# Patient Record
Sex: MALE | Race: WHITE | Employment: FULL TIME | URBAN - NONMETROPOLITAN AREA
[De-identification: names, ages, dates, MRNs, and addresses within clinical notes are randomized per-mention and may not be internally consistent; named-entity substitution may affect disease eponyms.]

---

## 2020-01-25 ENCOUNTER — HOSPITAL ENCOUNTER (EMERGENCY)
Age: 20
Discharge: HOME | End: 2020-01-25

## 2020-01-25 VITALS
OXYGEN SATURATION: 97 % | WEIGHT: 145 LBS | DIASTOLIC BLOOD PRESSURE: 87 MMHG | TEMPERATURE: 97.8 F | SYSTOLIC BLOOD PRESSURE: 142 MMHG | HEART RATE: 100 BPM | RESPIRATION RATE: 16 BRPM

## 2020-01-25 PROCEDURE — 99202 OFFICE O/P NEW SF 15 MIN: CPT

## 2020-01-25 PROCEDURE — 99214 OFFICE O/P EST MOD 30 MIN: CPT | Performed by: NURSE PRACTITIONER

## 2020-01-25 RX ORDER — PREDNISONE 20 MG/1
20 TABLET ORAL 2 TIMES DAILY
Qty: 10 TABLET | Refills: 0 | Status: SHIPPED | OUTPATIENT
Start: 2020-01-25 | End: 2020-01-30

## 2020-01-25 RX ORDER — CYCLOBENZAPRINE HCL 10 MG
10 TABLET ORAL 3 TIMES DAILY PRN
Qty: 15 TABLET | Refills: 0 | Status: SHIPPED | OUTPATIENT
Start: 2020-01-25 | End: 2020-01-30

## 2020-01-25 ASSESSMENT — ENCOUNTER SYMPTOMS
DIARRHEA: 0
EYE REDNESS: 0
EYE PAIN: 0
SORE THROAT: 0
BACK PAIN: 1
WHEEZING: 0
CONSTIPATION: 0
COUGH: 0
NAUSEA: 0
VOMITING: 0
EYE DISCHARGE: 0
RHINORRHEA: 0
ALLERGIC/IMMUNOLOGIC NEGATIVE: 1
SHORTNESS OF BREATH: 0
ABDOMINAL PAIN: 0
TROUBLE SWALLOWING: 0

## 2020-01-25 ASSESSMENT — PAIN SCALES - GENERAL: PAINLEVEL_OUTOF10: 7

## 2020-01-25 ASSESSMENT — PAIN DESCRIPTION - PAIN TYPE: TYPE: ACUTE PAIN

## 2020-01-25 ASSESSMENT — PAIN DESCRIPTION - DESCRIPTORS: DESCRIPTORS: ACHING

## 2020-01-25 ASSESSMENT — PAIN DESCRIPTION - LOCATION: LOCATION: BACK

## 2020-01-25 ASSESSMENT — PAIN DESCRIPTION - FREQUENCY: FREQUENCY: CONTINUOUS

## 2020-01-25 ASSESSMENT — PAIN DESCRIPTION - ORIENTATION: ORIENTATION: LOWER;LEFT

## 2020-01-25 NOTE — LETTER
[facility] [facility]  [address] [place] [id number]  Phone: [phone]               January 25, 2020    Patient: Saad Bonds   YOB: 2000   Date of Visit: 1/25/2020       To Whom It May Concern:    Shanda Ibanez was seen and treated in our emergency department on 1/25/2020. He may return to work on 1/27/2020.       Sincerely,       [facility], LPN         Signature:__________________________________
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
## 2020-07-10 ENCOUNTER — HOSPITAL ENCOUNTER (INPATIENT)
Age: 20
LOS: 4 days | Discharge: HOME | DRG: 885 | End: 2020-07-14
Attending: PSYCHIATRY & NEUROLOGY | Admitting: PSYCHIATRY & NEUROLOGY
Payer: COMMERCIAL

## 2020-07-10 PROBLEM — F32.9 MAJOR DEPRESSIVE EPISODE: Status: ACTIVE | Noted: 2020-07-10

## 2020-07-10 LAB
ACETAMINOPHEN LEVEL: < 5 UG/ML (ref 0–20)
ALBUMIN SERPL-MCNC: 5.1 G/DL (ref 3.5–5.1)
ALP BLD-CCNC: 90 U/L (ref 38–126)
ALT SERPL-CCNC: 20 U/L (ref 11–66)
AMPHETAMINE+METHAMPHETAMINE URINE SCREEN: NEGATIVE
ANION GAP SERPL CALCULATED.3IONS-SCNC: 11 MEQ/L (ref 8–16)
AST SERPL-CCNC: 17 U/L (ref 5–40)
BACTERIA: ABNORMAL /HPF
BARBITURATE QUANTITATIVE URINE: NEGATIVE
BASOPHILS # BLD: 0.8 %
BASOPHILS ABSOLUTE: 0.1 THOU/MM3 (ref 0–0.1)
BENZODIAZEPINE QUANTITATIVE URINE: NEGATIVE
BILIRUB SERPL-MCNC: 0.8 MG/DL (ref 0.3–1.2)
BILIRUBIN DIRECT: < 0.2 MG/DL (ref 0–0.3)
BILIRUBIN URINE: NEGATIVE
BLOOD, URINE: NEGATIVE
BUN BLDV-MCNC: 14 MG/DL (ref 7–22)
CALCIUM SERPL-MCNC: 9.9 MG/DL (ref 8.5–10.5)
CANNABINOID QUANTITATIVE URINE: POSITIVE
CASTS 2: ABNORMAL /LPF
CASTS UA: ABNORMAL /LPF
CHARACTER, URINE: CLEAR
CHLORIDE BLD-SCNC: 101 MEQ/L (ref 98–111)
CO2: 28 MEQ/L (ref 23–33)
COCAINE METABOLITE QUANTITATIVE URINE: NEGATIVE
COLOR: ABNORMAL
CREAT SERPL-MCNC: 0.8 MG/DL (ref 0.4–1.2)
CRYSTALS, UA: ABNORMAL
EOSINOPHIL # BLD: 4.1 %
EOSINOPHILS ABSOLUTE: 0.3 THOU/MM3 (ref 0–0.4)
EPITHELIAL CELLS, UA: ABNORMAL /HPF
ERYTHROCYTE [DISTWIDTH] IN BLOOD BY AUTOMATED COUNT: 12.2 % (ref 11.5–14.5)
ERYTHROCYTE [DISTWIDTH] IN BLOOD BY AUTOMATED COUNT: 40.3 FL (ref 35–45)
ETHYL ALCOHOL, SERUM: < 0.01 %
GFR SERPL CREATININE-BSD FRML MDRD: > 90 ML/MIN/1.73M2
GLUCOSE BLD-MCNC: 96 MG/DL (ref 70–108)
GLUCOSE URINE: NEGATIVE MG/DL
HCT VFR BLD CALC: 48.4 % (ref 42–52)
HEMOGLOBIN: 16.7 GM/DL (ref 14–18)
IMMATURE GRANS (ABS): 0.01 THOU/MM3 (ref 0–0.07)
IMMATURE GRANULOCYTES: 0.2 %
KETONES, URINE: ABNORMAL
LEUKOCYTE ESTERASE, URINE: ABNORMAL
LYMPHOCYTES # BLD: 28.5 %
LYMPHOCYTES ABSOLUTE: 1.8 THOU/MM3 (ref 1–4.8)
MCH RBC QN AUTO: 31.3 PG (ref 26–33)
MCHC RBC AUTO-ENTMCNC: 34.5 GM/DL (ref 32.2–35.5)
MCV RBC AUTO: 90.6 FL (ref 80–94)
MISCELLANEOUS 2: ABNORMAL
MONOCYTES # BLD: 8.6 %
MONOCYTES ABSOLUTE: 0.5 THOU/MM3 (ref 0.4–1.3)
NITRITE, URINE: NEGATIVE
NUCLEATED RED BLOOD CELLS: 0 /100 WBC
OPIATES, URINE: NEGATIVE
OSMOLALITY CALCULATION: 279.7 MOSMOL/KG (ref 275–300)
OXYCODONE: NEGATIVE
PH UA: 6 (ref 5–9)
PHENCYCLIDINE QUANTITATIVE URINE: NEGATIVE
PLATELET # BLD: 229 THOU/MM3 (ref 130–400)
PMV BLD AUTO: 9.2 FL (ref 9.4–12.4)
POTASSIUM SERPL-SCNC: 4.7 MEQ/L (ref 3.5–5.2)
PROTEIN UA: 30
RBC # BLD: 5.34 MILL/MM3 (ref 4.7–6.1)
RBC URINE: ABNORMAL /HPF
RENAL EPITHELIAL, UA: ABNORMAL
SALICYLATE, SERUM: < 0.3 MG/DL (ref 2–10)
SEG NEUTROPHILS: 57.8 %
SEGMENTED NEUTROPHILS ABSOLUTE COUNT: 3.6 THOU/MM3 (ref 1.8–7.7)
SODIUM BLD-SCNC: 140 MEQ/L (ref 135–145)
SPECIFIC GRAVITY, URINE: > 1.03 (ref 1–1.03)
T4 FREE: 1.39 NG/DL (ref 0.93–1.76)
TOTAL PROTEIN: 7.7 G/DL (ref 6.1–8)
TSH SERPL DL<=0.05 MIU/L-ACNC: 0.98 UIU/ML (ref 0.4–4.2)
UROBILINOGEN, URINE: 1 EU/DL (ref 0–1)
WBC # BLD: 6.3 THOU/MM3 (ref 4.8–10.8)
WBC UA: ABNORMAL /HPF
YEAST: ABNORMAL

## 2020-07-10 PROCEDURE — 80307 DRUG TEST PRSMV CHEM ANLYZR: CPT

## 2020-07-10 PROCEDURE — 81001 URINALYSIS AUTO W/SCOPE: CPT

## 2020-07-10 PROCEDURE — 84443 ASSAY THYROID STIM HORMONE: CPT

## 2020-07-10 PROCEDURE — 36415 COLL VENOUS BLD VENIPUNCTURE: CPT

## 2020-07-10 PROCEDURE — 84439 ASSAY OF FREE THYROXINE: CPT

## 2020-07-10 PROCEDURE — 1240000000 HC EMOTIONAL WELLNESS R&B

## 2020-07-10 PROCEDURE — G0480 DRUG TEST DEF 1-7 CLASSES: HCPCS

## 2020-07-10 PROCEDURE — 99285 EMERGENCY DEPT VISIT HI MDM: CPT

## 2020-07-10 PROCEDURE — 82248 BILIRUBIN DIRECT: CPT

## 2020-07-10 PROCEDURE — 85025 COMPLETE CBC W/AUTO DIFF WBC: CPT

## 2020-07-10 PROCEDURE — 80053 COMPREHEN METABOLIC PANEL: CPT

## 2020-07-10 PROCEDURE — 6360000002 HC RX W HCPCS: Performed by: PHYSICIAN ASSISTANT

## 2020-07-10 RX ORDER — LORAZEPAM 2 MG/ML
2 INJECTION INTRAMUSCULAR ONCE
Status: COMPLETED | OUTPATIENT
Start: 2020-07-10 | End: 2020-07-10

## 2020-07-10 RX ORDER — TRAZODONE HYDROCHLORIDE 50 MG/1
50 TABLET ORAL NIGHTLY PRN
Status: DISCONTINUED | OUTPATIENT
Start: 2020-07-10 | End: 2020-07-14 | Stop reason: HOSPADM

## 2020-07-10 RX ORDER — HALOPERIDOL 5 MG/ML
5 INJECTION INTRAMUSCULAR ONCE
Status: COMPLETED | OUTPATIENT
Start: 2020-07-10 | End: 2020-07-10

## 2020-07-10 RX ORDER — NICOTINE 21 MG/24HR
1 PATCH, TRANSDERMAL 24 HOURS TRANSDERMAL DAILY
Status: DISCONTINUED | OUTPATIENT
Start: 2020-07-10 | End: 2020-07-11

## 2020-07-10 RX ORDER — POLYETHYLENE GLYCOL 3350 17 G/17G
17 POWDER, FOR SOLUTION ORAL DAILY PRN
Status: DISCONTINUED | OUTPATIENT
Start: 2020-07-10 | End: 2020-07-14 | Stop reason: HOSPADM

## 2020-07-10 RX ORDER — DIPHENHYDRAMINE HYDROCHLORIDE 50 MG/ML
25 INJECTION INTRAMUSCULAR; INTRAVENOUS ONCE
Status: COMPLETED | OUTPATIENT
Start: 2020-07-10 | End: 2020-07-10

## 2020-07-10 RX ORDER — IBUPROFEN 400 MG/1
400 TABLET ORAL EVERY 6 HOURS PRN
Status: DISCONTINUED | OUTPATIENT
Start: 2020-07-10 | End: 2020-07-14 | Stop reason: HOSPADM

## 2020-07-10 RX ORDER — ACETAMINOPHEN 325 MG/1
650 TABLET ORAL EVERY 4 HOURS PRN
Status: DISCONTINUED | OUTPATIENT
Start: 2020-07-10 | End: 2020-07-14 | Stop reason: HOSPADM

## 2020-07-10 RX ADMIN — LORAZEPAM 2 MG: 2 INJECTION INTRAMUSCULAR; INTRAVENOUS at 16:13

## 2020-07-10 RX ADMIN — DIPHENHYDRAMINE HYDROCHLORIDE 25 MG: 50 INJECTION, SOLUTION INTRAMUSCULAR; INTRAVENOUS at 16:13

## 2020-07-10 RX ADMIN — HALOPERIDOL LACTATE 5 MG: 5 INJECTION INTRAMUSCULAR at 16:13

## 2020-07-10 ASSESSMENT — ENCOUNTER SYMPTOMS
SHORTNESS OF BREATH: 0
DIARRHEA: 0
ABDOMINAL PAIN: 0
SORE THROAT: 0
CONSTIPATION: 0
RHINORRHEA: 0
VOMITING: 0
NAUSEA: 0
COUGH: 0

## 2020-07-10 ASSESSMENT — SLEEP AND FATIGUE QUESTIONNAIRES
AVERAGE NUMBER OF SLEEP HOURS: 6
DO YOU HAVE DIFFICULTY SLEEPING: NO
DO YOU USE A SLEEP AID: NO

## 2020-07-10 ASSESSMENT — PAIN SCALES - GENERAL
PAINLEVEL_OUTOF10: 10
PAINLEVEL_OUTOF10: 0

## 2020-07-10 ASSESSMENT — LIFESTYLE VARIABLES: HISTORY_ALCOHOL_USE: NO

## 2020-07-10 ASSESSMENT — PATIENT HEALTH QUESTIONNAIRE - PHQ9: SUM OF ALL RESPONSES TO PHQ QUESTIONS 1-9: 10

## 2020-07-10 NOTE — ED NOTES
Attempting to calm the patient down at this time, patient refusing to cooperate with staff at this time.       Joaquin Flores, [place]  68/97/36 8711
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
## 2020-07-10 NOTE — ED NOTES
Pt yelling, hitting the wall at this time, trying to get out of the saferooms at this time. [place] police called at this time.       Joanne Zeng [place]  27/94/56 8098
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
## 2020-07-10 NOTE — ED NOTES
Pt eyes closed, respirations easy and unlabored. [place] police monitoring, will continue to monitor.       Joanne Zeng [place]  93/25/05 9878

## 2020-07-10 NOTE — ED NOTES
Patient placed in safe room that is ligature resistant with continuous monitoring in place. Provider notified, requested an assessment by behavioral health . Patient belongings secured in a locked lockers outside of the room. Explained suicide prevention precautions to the patient including constant observer.         Juan Carlos Mak [place]  05/27/74 0306

## 2020-07-10 NOTE — ED NOTES
ED to inpatient nurses report    Chief Complaint   Patient presents with    Suicidal      Present to ED from home with [facility] under [facility]  LOC: alert and orientated to name, place, date  Vital signs   Vitals:    07/10/20 1317 07/10/20 1340   BP: (!) 140/85    Pulse: 88    Resp: 20    Temp: 98 °F (36.7 °C)    TempSrc: Oral    SpO2: 98% 98%      Oxygen Baseline 98    Current needs required noen Bipap/Cpap No  LDAs:    Mobility: Independent  Pending ED orders: none  Present condition: stable, patient is EMC'd at this time.        Electronically signed by Grecia Manzanares LPN on 5/06/9450 at 1:22 PM       Grecia Manzanares LPN  35/98/74 7805

## 2020-07-10 NOTE — ED NOTES
Pt yelling profanity at this nurse, patient stating \"I need to get out of here and get to my dog. \" Attempted to teach the patient at this time, no evidence of learning at this time. Will continue to monitor.       Tia [place], [place]  59/56/59 5462

## 2020-07-10 NOTE — ED NOTES
Restraints completely removed at this time, patient asleep at this time. [place] police monitoring.         [place], [place]  35/78/59 7057

## 2020-07-10 NOTE — ED NOTES
Patient resting in bed, eyes closed, respirations easy and unlabored. Will continue to monitor.       Ilya [place], [place]  83/47/72 8606
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
## 2020-07-11 PROBLEM — F33.2 MDD (MAJOR DEPRESSIVE DISORDER), RECURRENT SEVERE, WITHOUT PSYCHOSIS (HCC): Chronic | Status: ACTIVE | Noted: 2020-07-10

## 2020-07-11 PROBLEM — F63.81 INTERMITTENT EXPLOSIVE DISORDER: Chronic | Status: ACTIVE | Noted: 2020-07-11

## 2020-07-11 PROBLEM — F12.20 CANNABIS USE DISORDER, MODERATE, DEPENDENCE (HCC): Chronic | Status: ACTIVE | Noted: 2020-07-11

## 2020-07-11 LAB
BASOPHILS # BLD: 0.6 %
BASOPHILS ABSOLUTE: 0 THOU/MM3 (ref 0–0.1)
EOSINOPHIL # BLD: 3.6 %
EOSINOPHILS ABSOLUTE: 0.3 THOU/MM3 (ref 0–0.4)
ERYTHROCYTE [DISTWIDTH] IN BLOOD BY AUTOMATED COUNT: 12.5 % (ref 11.5–14.5)
ERYTHROCYTE [DISTWIDTH] IN BLOOD BY AUTOMATED COUNT: 41.3 FL (ref 35–45)
HCT VFR BLD CALC: 47.2 % (ref 42–52)
HEMOGLOBIN: 16.4 GM/DL (ref 14–18)
IMMATURE GRANS (ABS): 0.02 THOU/MM3 (ref 0–0.07)
IMMATURE GRANULOCYTES: 0.3 %
LYMPHOCYTES # BLD: 26.7 %
LYMPHOCYTES ABSOLUTE: 2.1 THOU/MM3 (ref 1–4.8)
MCH RBC QN AUTO: 31.8 PG (ref 26–33)
MCHC RBC AUTO-ENTMCNC: 34.7 GM/DL (ref 32.2–35.5)
MCV RBC AUTO: 91.7 FL (ref 80–94)
MONOCYTES # BLD: 10.7 %
MONOCYTES ABSOLUTE: 0.8 THOU/MM3 (ref 0.4–1.3)
NUCLEATED RED BLOOD CELLS: 0 /100 WBC
PLATELET # BLD: 231 THOU/MM3 (ref 130–400)
PMV BLD AUTO: 9.8 FL (ref 9.4–12.4)
RBC # BLD: 5.15 MILL/MM3 (ref 4.7–6.1)
SEG NEUTROPHILS: 58.1 %
SEGMENTED NEUTROPHILS ABSOLUTE COUNT: 4.5 THOU/MM3 (ref 1.8–7.7)
WBC # BLD: 7.7 THOU/MM3 (ref 4.8–10.8)

## 2020-07-11 PROCEDURE — 1240000000 HC EMOTIONAL WELLNESS R&B

## 2020-07-11 PROCEDURE — 85025 COMPLETE CBC W/AUTO DIFF WBC: CPT

## 2020-07-11 PROCEDURE — 6370000000 HC RX 637 (ALT 250 FOR IP): Performed by: PSYCHIATRY & NEUROLOGY

## 2020-07-11 PROCEDURE — 36415 COLL VENOUS BLD VENIPUNCTURE: CPT

## 2020-07-11 RX ORDER — CITALOPRAM 20 MG/1
20 TABLET ORAL DAILY
Status: DISCONTINUED | OUTPATIENT
Start: 2020-07-11 | End: 2020-07-14 | Stop reason: HOSPADM

## 2020-07-11 RX ORDER — DIVALPROEX SODIUM 500 MG/1
500 TABLET, EXTENDED RELEASE ORAL 2 TIMES DAILY
Status: DISCONTINUED | OUTPATIENT
Start: 2020-07-11 | End: 2020-07-14 | Stop reason: HOSPADM

## 2020-07-11 RX ORDER — HYDROXYZINE HYDROCHLORIDE 25 MG/1
50 TABLET, FILM COATED ORAL 3 TIMES DAILY PRN
Status: DISCONTINUED | OUTPATIENT
Start: 2020-07-11 | End: 2020-07-14 | Stop reason: HOSPADM

## 2020-07-11 RX ADMIN — DIVALPROEX SODIUM 500 MG: 500 TABLET, EXTENDED RELEASE ORAL at 14:42

## 2020-07-11 RX ADMIN — CITALOPRAM 20 MG: 20 TABLET, FILM COATED ORAL at 14:42

## 2020-07-11 RX ADMIN — TRAZODONE HYDROCHLORIDE 50 MG: 50 TABLET ORAL at 20:51

## 2020-07-11 RX ADMIN — DIVALPROEX SODIUM 500 MG: 500 TABLET, EXTENDED RELEASE ORAL at 20:50

## 2020-07-11 ASSESSMENT — PAIN SCALES - GENERAL
PAINLEVEL_OUTOF10: 0
PAINLEVEL_OUTOF10: 0

## 2020-07-11 ASSESSMENT — LIFESTYLE VARIABLES: HISTORY_ALCOHOL_USE: NO

## 2020-07-11 NOTE — BH NOTE
Group Therapy Note    Date: 7/11/2020  Start Time: 1000  End Time:  1435  Number of Participants: 7    Type of Group: Psychotherapy      Group's Goal:  Increase insight into depression as an illness, symptoms, management of symptoms, medications, rational for compliance of meds, negative effects of drugs and alcohol on mood and mental health, benefits of counseling, and what to do if you have thoughts of suicide. Notes:  Each patient was asked to rate their mood today on scale of #1-10 with 10 the most depressed:  #7.    Status After Intervention:  Improved    Participation Level: Active Listener but quiet and withdrawn, needs prompting to share.     Participation Quality: Appropriate, Attentive, quiet and withdrawn      Speech:  normal      Thought Process/Content: Logical      Affective Functioning: Congruent, flat and blunt      Mood: anxious, depressed       Level of consciousness:  Alert, Oriented x4 and Attentive      Response to Learning: Able to verbalize current knowledge/experience, Able to verbalize/acknowledge new learning, Able to retain information, Capable of insight and Able to change behavior      Endings: None Reported    Modes of Intervention: Education, Support, Socialization and Exploration      Discipline Responsible: Registered Nurse      Signature:  PRINCE Escobedo, RN MSN
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
## 2020-07-11 NOTE — BH NOTE
Group Therapy Note    Date: 7/11/2020  Start Time: 1100  End Time:  4904  Number of Participants: 6    Type of Group: Psychoeducation    Self Esteem Group Goal:  Increase insight into definition of self esteem, where it comes from, why it is important, who / what affects it, self talk and actions, thoughts, behaviors that boost or bust self esteem. We played game of self talk. We ended group by listening to song called: \"Fear is a Liar\" by Shereen Tafoya. Gave praise for coming and sharing. Notes:  Each patient was asked to give one positive self trait:  \"I'm easy to click with\"  And rate their self esteem on scale of #1-10 with 10 the most self esteem:  #7 (incongruent with what he shares about not forgiving self in group) . Status After Intervention:  Improved    Participation Level:  Active Listener and Interactive    Participation Quality: Appropriate, Attentive, Sharing and Supportive      Speech:  normal      Thought Process/Content: Logical      Affective Functioning: Congruent, Blunted and Flat      Mood: depressed      Level of consciousness:  Alert, Oriented x4 and Attentive      Response to Learning: Able to verbalize current knowledge/experience, Able to verbalize/acknowledge new learning, Able to retain information, Capable of insight and Able to change behavior      Endings: None Reported    Modes of Intervention: Education, Support, Socialization, Exploration and Activity      Discipline Responsible: Registered Nurse      Signature:  PRINCE Richardson RN MSN

## 2020-07-11 NOTE — PLAN OF CARE
Problem: Altered Mood, Depressive Behavior:  Goal: Able to verbalize acceptance of life and situations over which he or she has no control  Description: Able to verbalize acceptance of life and situations over which he or she has no control  7/11/2020 1018 by Gurinder Best LPN  Outcome: Ongoing  Note: Patient continues to work on verbalization of acceptance of life and situations over which he has no control. 7/11/2020 0951 by Gurinder Best LPN  Outcome: Ongoing  Note: Patient continues to work on verbalization of acceptance of life and situations over which he has no control. Goal: Able to verbalize and/or display a decrease in depressive symptoms  Description: Able to verbalize and/or display a decrease in depressive symptoms  7/11/2020 1018 by Gurinder Best LPN  Outcome: Ongoing  Note: Patient reports depressive symptoms during shift assessment. Patient rates depression 2/10.  7/11/2020 0951 by Gurinder Best LPN  Outcome: Ongoing  Note: Patient   Goal: Ability to disclose and discuss suicidal ideas will improve  Description: Ability to disclose and discuss suicidal ideas will improve  7/11/2020 1018 by Gurinder Best LPN  Outcome: Ongoing  Note: Patient denies suicidal ideations during shift assessment. 7/11/2020 0951 by Gurinder Best LPN  Outcome: Ongoing  Goal: Able to verbalize support systems  Description: Able to verbalize support systems  7/11/2020 1018 by Gurinder Best LPN  Outcome: Ongoing  Note: Patient able to verbalize support system. Patient reports girlfriend is supportive. 7/11/2020 0951 by Gurinder Best LPN  Outcome: Ongoing  Goal: Absence of self-harm  Description: Absence of self-harm  7/11/2020 1018 by Gurinder Best LPN  Outcome: Ongoing  Note: Patient remains free from self-harming behavior at this time during shift.   7/11/2020 0951 by Gurinder Best LPN  Outcome: Ongoing  Goal: Patient specific goal  Description: Patient specific goal  7/11/2020 1018 by Aarti Duran LPN  Outcome: Ongoing  Note: Daily goal is to \"be better than yesterday\". 7/11/2020 0951 by Aarti Duran LPN  Outcome: Ongoing  Goal: Participates in care planning  Description: Participates in care planning  7/11/2020 1018 by Aarti Duran LPN  Outcome: Ongoing  Note: Patient participates in care planning with staff during shift. 7/11/2020 0951 by Aarti Duran LPN  Outcome: Ongoing     Problem: Discharge Planning:  Goal: Discharged to appropriate level of care  Description: Discharged to appropriate level of care  7/11/2020 1018 by Aarti Duran LPN  Outcome: Ongoing  Note: No discharge plans noted at this time during shift. Will need services set up for follow-up care. No previous psych history. 7/11/2020 0951 by Aarti Duran LPN  Outcome: Ongoing     Problem: KNOWLEDGE DEFICIT,EDUCATION,DISCHARGE PLAN  Goal: Knowledge - personal safety  7/11/2020 1018 by Aarti Duran LPN  Outcome: Ongoing  Note: No discharge plans noted at this time during shift. 7/11/2020 0951 by Aarti Duran LPN  Outcome: Ongoing     Problem: Coping - Ineffective, Individual:  Goal: Ability to identify and develop effective coping behavior will improve  Description: Ability to identify and develop effective coping behavior will improve  7/11/2020 1018 by Aarti Duran LPN  Outcome: Ongoing  Note: Patient reports smoking weed and cleaning out car as coping skills. Encouraged to attend groups/activities to acquire additional skills. 7/11/2020 0951 by Aarti Duran LPN  Outcome: Ongoing    Care plan reviewed with patient.   Patient does verbalize understanding of the plan of care and does contribute to goal setting

## 2020-07-11 NOTE — BH NOTE
Behavioral Health   Admission Note     Admission Type:        Reason for admission:       PATIENT STRENGTHS:  Strengths: Employment, Communication    Patient Strengths and Limitations:       Addictive Behavior:   Addictive Behavior  In the past 3 months, have you felt or has someone told you that you have a problem with:  : (Denies)  Do you have a history of Chemical Use?: No  Do you have a history of Alcohol Use?: No  Do you have a history of Street Drug Abuse?: Comment(Daily marijuana use, pt is comfortable with his level of use)  Histroy of Prescripton Drug Abuse?: No    Medical Problems:   No past medical history on file. Status EXAM:  Status and Exam  Normal: No  Facial Expression: Sad, Worried  Affect: Constricted  Level of Consciousness: Alert  Mood:Normal: No  Mood: Depressed, Sad  Motor Activity:Normal: Yes  Interview Behavior: Cooperative  Preception: [place] to Person, Syl Player to Time, [place] to Place, [place] to Situation  Attention:Normal: Yes  Thought Processes: (Linear )  Thought Content:Normal: Yes  Hallucinations: None  Delusions: No  Memory:Normal: Yes  Insight and Judgment: No  Insight and Judgment: Poor Judgment  Present Suicidal Ideation: No  Present Homicidal Ideation: No    Pt admitted with followings belongings:  Clothing: Pants, Shirt, Footwear  Other Valuables: Cell phone, [employer] [phone], [address] [place] [address], [facility] (Comment)     Admission order obtained yes. Valuables sent home with no. Valuables placed in safe in security envelope, number:  [id number]. Patient's home medications were none. Patient oriented to surroundings and program expectations and copy of patient rights given. Received admission packet:  yes. Consents reviewed, signed yes. \"An Important Message from [employer] About Your Rights\" form reviewed, signed na. Refused no. Patient verbalize understanding:  limited.     Patient education on precautions: yes           Patient screened positive for suicide risk on CSSR-S (\"yes\" to question #4, 5, OR 6) no.  Physician notified of risk score. Orders received no . Explained patients right to have family, representative or physician notified of their admission. Patient has Declined for physician to be notified. Patient has Declined for family/representative to be notified. Provided pt with [employer] handout entitled \"Quitting Smoking. \"  Reviewed handout with pt addressing dangers of smoking, developing coping skills, and providing basic information about quitting. Pt response to counseling:  No.    Patient noted very drowsy from medications given in ER.           Price Arroyo RN
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
## 2020-07-11 NOTE — BH NOTE
Group Therapy Note    Date: 7/11/2020  Start Time: 0900  End Time:  0930  Number of Participants: 7    Type of Group: Community Meeting    Group Goal:  To discuss attitudes and how they will affect their recovery. Each patient was asked to give one thing they are thankful for today:  And set small realistic goal for today to help in their recovery and resiliency. Patient is thankful for:  \"my animals has 2 pigs\"     Patient's Goal:  \"Live better today than I did yesterday\"        Status After Intervention:  Improved    Participation Level: Active Listener and needs prompting to share, accepts praise and support and gives it to peers.      Participation Quality: Appropriate, Attentive, Sharing and Supportive      Speech:  normal and pressured and stuttering      Thought Process/Content: Logical      Affective Functioning: Congruent, Blunted and Flat, and anxious    Mood: anxious and depressed      Level of consciousness:  Alert, Oriented x4, Attentive and Preoccupied      Response to Learning: Able to verbalize current knowledge/experience, Able to verbalize/acknowledge new learning, Able to retain information and Capable of insight      Endings: None Reported    Modes of Intervention: Education, Support and Socialization      Discipline Responsible: Registered Nurse      Signature:  PRINCE Oswald, RN MSN

## 2020-07-11 NOTE — BH NOTE
Nghia Batista R.N. has reviewed and agrees with Trisha Wren LPN's shift   Assessment.     Mak Resendiz  7/11/2020

## 2020-07-12 PROCEDURE — 6370000000 HC RX 637 (ALT 250 FOR IP): Performed by: PSYCHIATRY & NEUROLOGY

## 2020-07-12 PROCEDURE — 1240000000 HC EMOTIONAL WELLNESS R&B

## 2020-07-12 RX ORDER — TRAZODONE HYDROCHLORIDE 100 MG/1
100 TABLET ORAL NIGHTLY
Status: DISCONTINUED | OUTPATIENT
Start: 2020-07-12 | End: 2020-07-14 | Stop reason: HOSPADM

## 2020-07-12 RX ADMIN — TRAZODONE HYDROCHLORIDE 100 MG: 100 TABLET ORAL at 20:57

## 2020-07-12 RX ADMIN — DIVALPROEX SODIUM 500 MG: 500 TABLET, EXTENDED RELEASE ORAL at 20:57

## 2020-07-12 RX ADMIN — HYDROXYZINE HYDROCHLORIDE 50 MG: 25 TABLET ORAL at 23:05

## 2020-07-12 RX ADMIN — DIVALPROEX SODIUM 500 MG: 500 TABLET, EXTENDED RELEASE ORAL at 09:07

## 2020-07-12 RX ADMIN — TRAZODONE HYDROCHLORIDE 50 MG: 50 TABLET ORAL at 23:05

## 2020-07-12 RX ADMIN — CITALOPRAM 20 MG: 20 TABLET, FILM COATED ORAL at 09:07

## 2020-07-12 ASSESSMENT — SLEEP AND FATIGUE QUESTIONNAIRES
RESTFUL SLEEP: NO
DO YOU HAVE DIFFICULTY SLEEPING: YES
DO YOU USE A SLEEP AID: NO
AVERAGE NUMBER OF SLEEP HOURS: 5
DIFFICULTY ARISING: NO
DIFFICULTY STAYING ASLEEP: YES
DIFFICULTY FALLING ASLEEP: YES
SLEEP PATTERN: DIFFICULTY FALLING ASLEEP;DIFFICULTY ARISING;RESTLESSNESS;INSOMNIA

## 2020-07-12 ASSESSMENT — LIFESTYLE VARIABLES: HISTORY_ALCOHOL_USE: NO

## 2020-07-12 ASSESSMENT — PAIN SCALES - GENERAL
PAINLEVEL_OUTOF10: 0
PAINLEVEL_OUTOF10: 0

## 2020-07-12 ASSESSMENT — PATIENT HEALTH QUESTIONNAIRE - PHQ9: SUM OF ALL RESPONSES TO PHQ QUESTIONS 1-9: 14

## 2020-07-12 NOTE — BH NOTE
Group Therapy Note    Date: 7/11/2020   Start Time  2000  End Time:  2020  Number of Participants: 1    Type of Group: Wrap-Up/Relaxation    Wellness Binder Information  Module Name:  None  Session Number:  None    Patient's Goal:  To be better than yesterday    Notes:  Ongoing    Status After Intervention:  Improved    Participation Level: Interactive    Participation Quality: Attentive      Speech:  hesitant      Thought Process/Content: Logical      Affective Functioning: Blunted      Mood: depressed      Level of consciousness:  Oriented x4      Response to Learning: Capable of insight      Endings: None Reported    Modes of Intervention: Education      Discipline Responsible: Registered Nurse      Signature:   Yolette Gonzales RN

## 2020-07-12 NOTE — PLAN OF CARE
Problem: Altered Mood, Depressive Behavior:  Goal: Able to verbalize acceptance of life and situations over which he or she has no control  Description: Able to verbalize acceptance of life and situations over which he or she has no control  7/12/2020 1155 by Keyanna Mohamud LPN  Outcome: Ongoing  Note: Patient continues to work on verbalization of acceptance of life and situations over which he has no control. 7/11/2020 2321 by Jorge A Borrero RN  Outcome: Ongoing  Note: Patient verbalizes fair acceptance of situations over which he has no control. Goal: Able to verbalize and/or display a decrease in depressive symptoms  Description: Able to verbalize and/or display a decrease in depressive symptoms  7/12/2020 1155 by Keyanna Mohamud LPN  Outcome: Ongoing  Note: Patient reports depressive symptoms during shift assessment. Patient rates depression 2/10.  7/11/2020 2321 by Jorge A Borrero RN  Outcome: Ongoing  Note: Patient noted with a blunt affect and brightens slightly with interaction. Goal: Ability to disclose and discuss suicidal ideas will improve  Description: Ability to disclose and discuss suicidal ideas will improve  7/12/2020 1155 by Keyanna Mohamud LPN  Outcome: Ongoing  Note: Patient denies suicidal ideations during shift assessment. 7/11/2020 2321 by Jorge A Borrero RN  Outcome: Ongoing  Note: Patient denies any suicidal thoughts so far this shift. Goal: Absence of self-harm  Description: Absence of self-harm  7/12/2020 1155 by Keyanna Mohamud LPN  Outcome: Ongoing  Note: Patient remains free from self-harming behavior at this time during shift. 7/11/2020 2321 by Jorge A Borrero RN  Outcome: Ongoing  Note: No self harm noted so far this shift. Goal: Patient specific goal  Description: Patient specific goal  7/12/2020 1155 by Keyanna Mohamud LPN  Outcome: Ongoing  Note: Daily goal is to try to get discharged.   7/11/2020 2321 by Jorge A Borrero RN  Outcome: Ongoing  Note: Patient stated his goal was to be better than yesterday. Patient continues to work on this goal.  Goal: Participates in care planning  Description: Participates in care planning  7/12/2020 1155 by Kavitha Griffin LPN  Outcome: Ongoing  Note: Patient participates in care planning with staff during shift. 7/11/2020 2321 by Brenna Pierre RN  Outcome: Ongoing  Note: Care plan reviewed with patient and fair understanding verbalized. Problem: Discharge Planning:  Goal: Discharged to appropriate level of care  Description: Discharged to appropriate level of care  7/12/2020 1155 by Kavitha Griffin LPN  Outcome: Ongoing  Note: No discharge plans noted at this time during shift. 7/11/2020 2321 by Brenna Pierre RN  Outcome: Ongoing  Note: Patient states he plans to return home with his girlfriend at discharge. Problem: KNOWLEDGE DEFICIT,EDUCATION,DISCHARGE PLAN  Goal: Knowledge - personal safety  7/12/2020 1155 by Kavitha Griffin LPN  Outcome: Ongoing  Note: Patient did not complete safety plan at this time during shift. 7/11/2020 2321 by Brenna Pierre RN  Outcome: Ongoing  Note: Patient maintained in a safe environment. 15 minute visual checks continued. Problem: Altered Mood, Depressive Behavior:  Goal: Able to verbalize support systems  Description: Able to verbalize support systems  7/12/2020 1155 by Kavitha Griffin LPN  Outcome: Completed  Note: Patient able to verbalize support system. Patient reports his girlfriend and her family is supportive. 7/11/2020 2321 by Brenna Pierre RN  Outcome: Ongoing  Note: Patient states his girlfriend is his current support system.      Problem: Coping - Ineffective, Individual:  Goal: Ability to identify and develop effective coping behavior will improve  Description: Ability to identify and develop effective coping behavior will improve  7/12/2020 1155 by Kavitha Griffin LPN  Outcome: Completed  Note: Patient reports cleaning as coping and playing video games to help reduce stressors. 7/11/2020 2321 by Marcos Dudley RN  Outcome: Ongoing  Note: Ongoing. Care plan reviewed with patient.   Patient does verbalize understanding of the plan of care and does contribute to goal setting

## 2020-07-12 NOTE — GROUP NOTE
Group Therapy Note    Date: 7/12/2020    Group Start Time: 1100  Group End Time: 5155  Group Topic: [address] Adult Psych 4E    Joel Kimball, KES        Group Therapy Note    Attendees: 7         Patient's Goal:  To get discharged- and to be more positive. Notes:  Pt initially made his goal to to be discharged but he was able to have enough insight to know that he is not going home until most likely Tuesday. Pt reports that he is feeling restless and is eager for discharge. Status After Intervention:  Improved    Participation Level:  Active Listener and Interactive    Participation Quality: Appropriate, Attentive and Sharing      Speech:  normal and hesitant      Thought Process/Content: Logical      Affective Functioning: Congruent      Mood: euthymic      Level of consciousness:  Alert, Oriented x4 and Attentive      Response to Learning: Able to verbalize current knowledge/experience, Able to retain information and Progressing to goal      Endings: None Reported    Modes of Intervention: Education, Support, Socialization, Exploration, Clarifying, Activity, Media, Limit-setting and Reality-testing      Discipline Responsible: Psychoeducational Specialist      Signature:  Jorge Rubio

## 2020-07-12 NOTE — PROGRESS NOTES
07/10/2020 0.2  % Final    Segs Absolute 07/10/2020 3.6  1.8 - 7.7 thou/mm3 Final    Lymphocytes Absolute 07/10/2020 1.8  1.0 - 4.8 thou/mm3 Final    Monocytes Absolute 07/10/2020 0.5  0.4 - 1.3 thou/mm3 Final    Eosinophils Absolute 07/10/2020 0.3  0.0 - 0.4 thou/mm3 Final    Basophils Absolute 07/10/2020 0.1  0.0 - 0.1 thou/mm3 Final    Immature Grans (Abs) 07/10/2020 0.01  0.00 - 0.07 thou/mm3 Final    nRBC 07/10/2020 0  /100 wbc Final    Performed at [address] [place] [address], [address]    Sodium 07/10/2020 140  135 - 145 meq/L Final    Potassium 07/10/2020 4.7  3.5 - 5.2 meq/L Final    Chloride 07/10/2020 101  98 - 111 meq/L Final    CO2 07/10/2020 28  23 - 33 meq/L Final    Glucose 07/10/2020 96  70 - 108 mg/dL Final    BUN 07/10/2020 14  7 - 22 mg/dL Final    CREATININE 07/10/2020 0.8  0.4 - 1.2 mg/dL Final    Calcium 07/10/2020 9.9  8.5 - 10.5 mg/dL Final    Performed at [address] [place] [address], [address]    Alb 07/10/2020 5.1  3.5 - 5.1 g/dL Final    Total Bilirubin 07/10/2020 0.8  0.3 - 1.2 mg/dL Final    Bilirubin, Direct 07/10/2020 <0.2  0.0 - 0.3 mg/dL Final    Alkaline Phosphatase 07/10/2020 90  38 - 126 U/L Final    AST 07/10/2020 17  5 - 40 U/L Final    ALT 07/10/2020 20  11 - 66 U/L Final    Total Protein 07/10/2020 7.7  6.1 - 8.0 g/dL Final    Performed at [address] [place] [address], [facility]    TSH 07/10/2020 0.984  0.400 - 4.20 uIU/mL Final    Performed at [address], [address]    Acetaminophen Level 07/10/2020 < 5.0  0.0 - 20.0 ug/mL Final    Performed at [address] [place] [address], [address]    Salicylate, Serum 99/25/7284 < 0.3* 2.0 - 10.0 mg/dL Final    Performed at [address] [place] [address], [address]    ETHYL ALCOHOL, SERUM 07/10/2020 < 0.01  0.00 % Final    Performed at [facility] [place], [address] [place] AMPHETAMINE+METHAMPHETAMINE URINE * 07/10/2020 Negative  NEGATIVE Final    Barbiturate Quant, Ur 07/10/2020 Negative  NEGATIVE Final    Benzodiazepine Quant, Ur 07/10/2020 Negative  NEGATIVE Final    Cannabinoid Quant, Ur 07/10/2020 POSITIVE  NEGATIVE Final    Cocaine Metab Quant, Ur 07/10/2020 Negative  NEGATIVE Final    Opiates, Urine 07/10/2020 Negative  NEGATIVE Final    Oxycodone 07/10/2020 Negative  NEGATIVE Final    PCP Quant, Ur 07/10/2020 Negative  NEGATIVE Final    Comment: A \"Negative\" result for a drug abuse screen test indicates     that the drug concentration is below the following cutoffs:            Amphetamine/Methamphetamine     1000 ng/ml            Barbiturate                      200 ng/ml            Benzodiazapine                   200 ng/ml            Cannabinoids                      50 ng/ml            Cocaine Metabolite               300 ng/ml            Opiates                          300 ng/ml            Oxycodone                        100 ng/ml            Phencyclidine                     25 ng/ml  A \"Positive\" result for a drug abuse screen test should be     considered presumptive positive until/unless confirmed     by another method. (Additional request)  Quantitative values from a reference laboratory are     available upon additional request.  These results are for medical use only.   Performed at [address] [place] [address], [address]      Glucose, Ur 07/10/2020 NEGATIVE  NEGATIVE mg/dl Final    Bilirubin Urine 07/10/2020 NEGATIVE  NEGATIVE Final    Ketones, Urine 07/10/2020 TRACE* NEGATIVE Final    Specific Gravity, Urine 07/10/2020 > 1.030* 1.002 - 1.03 Final    Blood, Urine 07/10/2020 NEGATIVE  NEGATIVE Final    pH, UA 07/10/2020 6.0  5.0 - 9.0 Final    Protein, UA 07/10/2020 30* NEGATIVE Final    Urobilinogen, Urine 07/10/2020 1.0  0.0 - 1.0 eu/dl Final    Nitrite, Urine 07/10/2020 NEGATIVE  NEGATIVE Final    Leukocyte Esterase, Urine 07/10/2020 TRACE* NEGATIVE Final    Color, UA 07/10/2020 DK YELLOW* STRAW-YELL Final    Character, Urine 07/10/2020 CLEAR  CLEAR-SL C Final    RBC, UA 07/10/2020 0-2  0-2/hpf /hpf Final    WBC, UA 07/10/2020 0-2  0-4/hpf /hpf Final    Epithelial Cells, UA 07/10/2020 0-2  3-5/hpf /hpf Final    Bacteria, UA 07/10/2020 NONE SEEN  FEW/NONE S /hpf Final    Casts UA 07/10/2020 4-8 HYALINE  NONE SEEN /lpf Final    Crystals, UA 07/10/2020 NONE SEEN  NONE SEEN Final    Renal Epithelial, UA 07/10/2020 NONE SEEN  NONE SEEN Final    Yeast, UA 07/10/2020 NONE SEEN  NONE SEEN Final    CASTS 2 07/10/2020 NONE SEEN  NONE SEEN /lpf Final    MISCELLANEOUS 2 07/10/2020 NONE SEEN   Final    Performed at [address] [place] [address], [facility]    Anion Gap 07/10/2020 11.0  8.0 - 16.0 meq/L Final    Comment: ANION GAP = Sodium -(Chloride + CO2)  Performed at [address] [place] [address], [facility]      Silver Constant Filt Rate 07/10/2020 >90  ml/min/1.73m2 Final    Comment: Stage Description                    GFR, ml/min/1.73 m2   -   At increased risk               > or = 60 (with chronic                                       kidney disease risk factors)   1   Normal or increased GFR         > or = 90   2   Mildly or decreased GFR         60 - 89   3   Moderately decreased GFR        30 - 59   4   Severely decreased GFR          15 - 29   5   Kidney failure                  <15 (or dialysis)  Estimated GFR calculated using abbreviated MDRD formula as  recommended by [employer]. Calculation based  upon serum creatinine and adjusted for age, gender & race. Shirley. Internal Med., Vol. 139 (2) pg 137-147.   Performed at [facility], [address]      Osmolality Calc 07/10/2020 279.7  275.0 - 300 mOsmol/kg Final    Performed at [address] [place] [address], [facility]    T4 Free 07/10/2020 1.39  0.93 - 1.76 ng/dL Final    Performed at [facility]  Patient s symptoms   are improving  Continue with current medications. Increase trazodone  Attempt to develop insight  Psycho-education conducted. Supportive Therapy conducted.

## 2020-07-12 NOTE — GROUP NOTE
Group Therapy Note    Date: 7/12/2020    Group Start Time: 0900  Group End Time: 1000  Group Topic: Recreational    [facility] Adult [facility] 4E    Yesicajones Macias, CTRS        Group Therapy Note    Attendees: 7         Patient's Goal:  Pt to participate in recreational activities on the unit with peers appropriately in order to increase knowledge of coping skills and socialization with peers. Notes:  Pt is engaging in recreational activities appropriately on the unit with others and is seen socializing with peers. Pt is seen playing a game of Monopoly on the unit with peers appropriately. Status After Intervention:  Improved    Participation Level:  Active Listener and Interactive    Participation Quality: Appropriate, Attentive and Sharing      Speech:  normal and hesitant      Thought Process/Content: Logical      Affective Functioning: Flat      Mood: dysphoric      Level of consciousness:  Alert, Oriented x4 and Attentive      Response to Learning: Able to verbalize current knowledge/experience, Able to retain information and Progressing to goal      Endings: None Reported    Modes of Intervention: Education, Support, Socialization, Exploration, Clarifying, Activity, Limit-setting and Reality-testing      Discipline Responsible: Psychoeducational Specialist      Signature:  [place] Cassandra

## 2020-07-12 NOTE — BH NOTE
Baljit George R.N. has reviewed and agrees with [facility] JAGDEEP's shift   Assessment.     Bessy Resendiz  7/12/2020

## 2020-07-12 NOTE — BH NOTE
Group Therapy Note    Date: 7/12/2020  Start Time: 1630  End Time:  1700  Number of Participants: 8    Type of Group: Healthy Living/Wellness      Status After Intervention:  Improved    Participation Level:  Active Listener and Interactive    Participation Quality: Appropriate and Attentive      Speech:  normal      Thought Process/Content: Logical      Affective Functioning: Congruent      Mood: euthymic      Level of consciousness:  Alert and Attentive      Response to Learning: Able to verbalize current knowledge/experience, Able to verbalize/acknowledge new learning, Able to retain information and Able to change behavior      Endings: None Reported    Modes of Intervention: Education, Socialization and Exploration      Discipline Responsible: Registered Nurse      Signature:  Behzad Yanez RN

## 2020-07-12 NOTE — PLAN OF CARE
Problem: Altered Mood, Depressive Behavior:  Goal: Able to verbalize acceptance of life and situations over which he or she has no control  Description: Able to verbalize acceptance of life and situations over which he or she has no control  7/11/2020 2321 by Brenna Pierre RN  Outcome: Ongoing  Note: Patient verbalizes fair acceptance of situations over which he has no control. 7/11/2020 1018 by Kavitha Griffin LPN  Outcome: Ongoing  Note: Patient continues to work on verbalization of acceptance of life and situations over which he has no control. 7/11/2020 0951 by Kavitha Griffin LPN  Outcome: Ongoing  Note: Patient continues to work on verbalization of acceptance of life and situations over which he has no control. Goal: Able to verbalize and/or display a decrease in depressive symptoms  Description: Able to verbalize and/or display a decrease in depressive symptoms  7/11/2020 2321 by Brenna Pierre RN  Outcome: Ongoing  Note: Patient noted with a blunt affect and brightens slightly with interaction. 7/11/2020 1018 by Kavitha Griffin LPN  Outcome: Ongoing  Note: Patient reports depressive symptoms during shift assessment. Patient rates depression 2/10.  7/11/2020 0951 by Kavitha Griffin LPN  Outcome: Ongoing  Note: Patient   Goal: Ability to disclose and discuss suicidal ideas will improve  Description: Ability to disclose and discuss suicidal ideas will improve  7/11/2020 2321 by Brenna Pierre RN  Outcome: Ongoing  Note: Patient denies any suicidal thoughts so far this shift. 7/11/2020 1018 by Kavitha Griffin LPN  Outcome: Ongoing  Note: Patient denies suicidal ideations during shift assessment. 7/11/2020 0951 by Kavitha Griffin LPN  Outcome: Ongoing  Goal: Able to verbalize support systems  Description: Able to verbalize support systems  7/11/2020 2321 by Brenna Pierre RN  Outcome: Ongoing  Note: Patient states his girlfriend is his current support system.   7/11/2020 1018 by Nghia Manley LPN  Outcome: Ongoing  Note: Patient able to verbalize support system. Patient reports girlfriend is supportive. 7/11/2020 0951 by Nghia Manley LPN  Outcome: Ongoing  Goal: Absence of self-harm  Description: Absence of self-harm  7/11/2020 2321 by Denise Bob RN  Outcome: Ongoing  Note: No self harm noted so far this shift. 7/11/2020 1018 by Nghia Manley LPN  Outcome: Ongoing  Note: Patient remains free from self-harming behavior at this time during shift. 7/11/2020 0951 by Nghia Manley LPN  Outcome: Ongoing  Goal: Patient specific goal  Description: Patient specific goal  7/11/2020 2321 by Denise Bob RN  Outcome: Ongoing  Note: Patient stated his goal was to be better than yesterday. Patient continues to work on this goal.  7/11/2020 1018 by Nghia Manley LPN  Outcome: Ongoing  Note: Daily goal is to \"be better than yesterday\". 7/11/2020 0951 by Nghia Manley LPN  Outcome: Ongoing  Goal: Participates in care planning  Description: Participates in care planning  7/11/2020 2321 by Denise Bob RN  Outcome: Ongoing  Note: Care plan reviewed with patient and fair understanding verbalized. 7/11/2020 1018 by Nghia Manley LPN  Outcome: Ongoing  Note: Patient participates in care planning with staff during shift. 7/11/2020 0951 by Nghia Manley LPN  Outcome: Ongoing     Problem: Discharge Planning:  Goal: Discharged to appropriate level of care  Description: Discharged to appropriate level of care  7/11/2020 2321 by Denise Bob RN  Outcome: Ongoing  Note: Patient states he plans to return home with his girlfriend at discharge. 7/11/2020 1018 by Nghia Manley LPN  Outcome: Ongoing  Note: No discharge plans noted at this time during shift. Will need services set up for follow-up care. No previous psych history.   7/11/2020 0951 by Nghia Manley LPN  Outcome: Ongoing     Problem: Coping - Ineffective, Individual:  Goal: Ability to identify and develop effective coping behavior will improve  Description: Ability to identify and develop effective coping behavior will improve  7/11/2020 2321 by Marcella Lin RN  Outcome: Ongoing  Note: Ongoing. 7/11/2020 1018 by Diamond Adame LPN  Outcome: Ongoing  Note: Patient reports smoking weed and cleaning out car as coping skills. Encouraged to attend groups/activities to acquire additional skills. 7/11/2020 0951 by Diamond Adame LPN  Outcome: Ongoing     Problem: KNOWLEDGE DEFICIT,EDUCATION,DISCHARGE PLAN  Goal: Knowledge - personal safety  7/11/2020 2321 by Marcella Lin RN  Outcome: Ongoing  Note: Patient maintained in a safe environment. 15 minute visual checks continued. 7/11/2020 1018 by Diamond Adame LPN  Outcome: Ongoing  Note: No discharge plans noted at this time during shift. 7/11/2020 0951 by Diamond Adame LPN  Outcome: Ongoing   Care plan reviewed with patient.   Patient does verbalize understanding of the plan of care and does contribute to goal setting

## 2020-07-12 NOTE — BH NOTE
INPATIENT RECREATIONAL THERAPY  ADULT BEHAVIORAL SERVICES  EVALUATION    REFERRING PHYSICIAN:  Dr. Marta Johnson  DIAGNOSIS:   Major Depressive Disorder, Recurrent   PRECAUTIONS: Standard Precautions     HISTORY OF PRESENT ILLNESS/INJURY: Pt is admitted to the unit on an [facility] from the [facility] office. Per the [facility] the pt reported that he wanted to end his life multiple times, and reportedly did have a plan to do so with his car. Pt has a hx of depression since being in the care of his grandparents due to his parents illicit drug use. Pt reports increased fatigue due to working long hours at his job. Pt reports that his family, girlfriend, and dog are his primary support. Pt appears to be minimizing depressive symptoms. PMH:  Please see medical chart for prior medical history, allergies, and medication    HISTORY OF PSYCHIATRIC TREATMENT: Pt reports no previous inpatient psychiatric treatment. Pt has attempted suicide in the past in high school jumping off of a bridge. However, the bridge was only 8 feet. Pt has reported going to counseling in the past and being on medications in the past but was not compliant. YOB: 2000  GENDER:  Male   MARITAL STATUS:  Single   EMPLOYMENT STATUS:  Employed- [employer]   LIVING SITUATION/SUPPORT:  Lives with girlfriend  EDUCATIONAL LEVEL: Graduated   MEDICATION/DRUG USE: Pt denies a hx of alcohol use but reports that he has been smoking marijuana daily since he was 13. Pt denies all other illicit drug use.      LEISURE INTERESTS:  Activities with girlfriend, animal care, listening to music, sports, watching TV/movies games/cards  ACTIVITY PREFERENCE: No preference   ACTIVITY TYPES:  Indoor, Outdoor, Active, Passive  COGNITION: A&OX4    COPING: Poor   ATTENTION: Fair   RELAXATION: Fair   SELF-ESTEEM: Poor   MOTIVATION:  Poor     SOCIAL SKILLS:  Fair  FRUSTRATION TOLERANCE:  Pt is noted to have had instances of extreme irritability and has been given IM

## 2020-07-13 PROCEDURE — 1240000000 HC EMOTIONAL WELLNESS R&B

## 2020-07-13 PROCEDURE — 6370000000 HC RX 637 (ALT 250 FOR IP): Performed by: PSYCHIATRY & NEUROLOGY

## 2020-07-13 RX ADMIN — TRAZODONE HYDROCHLORIDE 50 MG: 50 TABLET ORAL at 23:00

## 2020-07-13 RX ADMIN — DIVALPROEX SODIUM 500 MG: 500 TABLET, EXTENDED RELEASE ORAL at 21:25

## 2020-07-13 RX ADMIN — CITALOPRAM 20 MG: 20 TABLET, FILM COATED ORAL at 09:40

## 2020-07-13 RX ADMIN — DIVALPROEX SODIUM 500 MG: 500 TABLET, EXTENDED RELEASE ORAL at 09:40

## 2020-07-13 RX ADMIN — TRAZODONE HYDROCHLORIDE 100 MG: 100 TABLET ORAL at 21:25

## 2020-07-13 ASSESSMENT — PAIN SCALES - GENERAL
PAINLEVEL_OUTOF10: 0
PAINLEVEL_OUTOF10: 0

## 2020-07-13 NOTE — BH NOTE
PLAN OF CARE:     Start Time: 0900  End Time:   0930    Group Topic:  Daily Goals    Group Type:   Goal Group    Intervention/Goal:  To increase support and identify daily goals    Attendance:   Participated in group but did not attend. Affect:   blunt    Behavior: guarded    Response:    appropriate    Daily Goal:  To go home.     Progress:  Progressing to goal

## 2020-07-13 NOTE — PLAN OF CARE
Problem: Altered Mood, Depressive Behavior:  Goal: Able to verbalize acceptance of life and situations over which he or she has no control  Description: Able to verbalize acceptance of life and situations over which he or she has no control  7/12/2020 2118 by Anton Starr RN  Outcome: Ongoing  Note: Patient verbalizes fair understanding of situations over which he has no control. 7/12/2020 1155 by Irma Rahman LPN  Outcome: Ongoing  Note: Patient continues to work on verbalization of acceptance of life and situations over which he has no control. Goal: Able to verbalize and/or display a decrease in depressive symptoms  Description: Able to verbalize and/or display a decrease in depressive symptoms  7/12/2020 2118 by Anton Starr RN  Outcome: Ongoing  Note: Patient noted with a blunt affect and brightens slightly with interaction. Patient denies any feelings of depression this shift. 7/12/2020 1155 by Irma Rahman LPN  Outcome: Ongoing  Note: Patient reports depressive symptoms during shift assessment. Patient rates depression 2/10. Goal: Ability to disclose and discuss suicidal ideas will improve  Description: Ability to disclose and discuss suicidal ideas will improve  7/12/2020 2118 by Anton Starr RN  Outcome: Ongoing  Note: Patient denies any suicidal thoughts so far this shift. 7/12/2020 1155 by Irma Rahman LPN  Outcome: Ongoing  Note: Patient denies suicidal ideations during shift assessment. Goal: Able to verbalize support systems  Description: Able to verbalize support systems  7/12/2020 1155 by Irma Rahman LPN  Outcome: Completed  Note: Patient able to verbalize support system. Patient reports his girlfriend and her family is supportive. Goal: Absence of self-harm  Description: Absence of self-harm  7/12/2020 2118 by Anton Starr RN  Outcome: Ongoing  Note: No self harm noted so far this shift.   7/12/2020 1155 by Irma Rahman LPN  Outcome: Ongoing  Note: Patient remains free from self-harming behavior at this time during shift. Goal: Patient specific goal  Description: Patient specific goal  7/12/2020 2118 by Wilfredo Nelson RN  Outcome: Ongoing  Note: Patient states his goal is to get discharged as soon as possible. Patient states he is hopeful for discharge Monday or Tuesday. 7/12/2020 1155 by Brandee Mjeia LPN  Outcome: Ongoing  Note: Daily goal is to try to get discharged. Goal: Participates in care planning  Description: Participates in care planning  7/12/2020 2118 by Wilfredo Nelson RN  Outcome: Ongoing  Note: Care plan reviewed with patient and fair understanding verbalized. 7/12/2020 1155 by Brandee Mejia LPN  Outcome: Ongoing  Note: Patient participates in care planning with staff during shift. Problem: Discharge Planning:  Goal: Discharged to appropriate level of care  Description: Discharged to appropriate level of care  7/12/2020 2118 by Wilfredo Nelson RN  Outcome: Ongoing  Note: Patient states he plans to return home with his girlfriend at discharge. 7/12/2020 1155 by Brandee Mejia LPN  Outcome: Ongoing  Note: No discharge plans noted at this time during shift. Problem: Coping - Ineffective, Individual:  Goal: Ability to identify and develop effective coping behavior will improve  Description: Ability to identify and develop effective coping behavior will improve  7/12/2020 1155 by Brandee Mejia LPN  Outcome: Completed  Note: Patient reports cleaning as coping and playing video games to help reduce stressors. Problem: KNOWLEDGE DEFICIT,EDUCATION,DISCHARGE PLAN  Goal: Knowledge - personal safety  7/12/2020 2118 by Wilfredo Nelson RN  Outcome: Ongoing  Note: Patient maintained in a safe environment. 15 minute visual checks continued. 7/12/2020 1155 by Brandee Mejia LPN  Outcome: Ongoing  Note: Patient did not complete safety plan at this time during shift.    Care plan reviewed with patient.   Patient does verbalize understanding of the plan of care and does contribute to goal setting

## 2020-07-13 NOTE — PLAN OF CARE
Patient did not attend any of the groups today so he has not met his socialization goal for today. Patient did come out of his room for a short time and color but did not socialize with others. Patient will be encouraged to attend all groups on the unit daily.

## 2020-07-13 NOTE — PROGRESS NOTES
This RN has reviewed and agrees with Valerio Parker LPN's data collection and has collaborated with this LPN regarding the patient's care plan.

## 2020-07-13 NOTE — BH NOTE
Group Therapy Note    Date: 7/12/2020  Start Time: 2000  End Time:  2020  Number of Participants: 1    Type of Group: Wrap-Up/Relaxation    Wellness Binder Information  Module Name:  None  Session Number:  None    Patient's Goal:  To go home as soon as possible    Notes:  Ongoing    Status After Intervention:  Improved    Participation Level: Interactive    Participation Quality: Attentive      Speech:  hesitant      Thought Process/Content: Logical      Affective Functioning: Blunted      Mood: anxious      Level of consciousness:  Oriented x4      Response to Learning: Capable of insight      Endings: None Reported    Modes of Intervention: Education      Discipline Responsible: Registered Nurse      Signature:   Ruth Ann Londono RN

## 2020-07-13 NOTE — PROGRESS NOTES
develop insight  Psycho-education conducted. Supportive Therapy conducted.   Probable discharge is tomorrow  Follow-up with Dr. Bobbi Bacon and therapist.

## 2020-07-13 NOTE — GROUP NOTE
Group Therapy Note    Date: 7/13/2020    Group Start Time: 1400  Group End Time: 1500  Group Topic: Psychoeducation    [facility] Adult Psych 4E    CATHY Galicia LSW        Group Therapy Note    Attendees: 5         Patient's Goal:  Patient attends group and participates to the conversation. Patient is able to listen, challenge, and learn from the perception of others. Patient is able to identify a family history of mental health. Patient can identify stressors and symptoms related to anxiety. Patient is able to explore situations/circumstances that are calming. Patient participates in a mindful breathing exercise. Patient was able to share with the group the history of substance use in his family and the affects it has had on him. Patient states that Dejuan Smith is not the life he wants for his family one day\". Patient identifies stressors in his relationship. He identifies \"speeding in the car\" as a way to relieve stress     Notes:  Psychoeducational group covering stress management & mindfulness     Status After Intervention:  Improved    Participation Level:  Active Listener    Participation Quality: Appropriate, Attentive, Sharing and Supportive      Speech:  normal      Thought Process/Content: Logical  Linear      Affective Functioning: Congruent      Mood: euthymic      Level of consciousness:  Alert and Oriented x4      Response to Learning: Able to verbalize current knowledge/experience, Able to verbalize/acknowledge new learning, Able to retain information, Capable of insight and Progressing to goal      Endings: None Reported    Modes of Intervention: Education, Support, Socialization, Exploration, Clarifying, Problem-solving and Activity      Discipline Responsible: /Counselor      Signature:  CATHY Galicia LSW

## 2020-07-13 NOTE — PLAN OF CARE
Problem: Altered Mood, Depressive Behavior:  Goal: Able to verbalize acceptance of life and situations over which he or she has no control  Description: Able to verbalize acceptance of life and situations over which he or she has no control  Outcome: Ongoing  Note: Patient continues to work on verbalization of acceptance of life and situations over which he has no control. Goal: Able to verbalize and/or display a decrease in depressive symptoms  Description: Able to verbalize and/or display a decrease in depressive symptoms  Outcome: Ongoing  Note: Patient reports depressive symptoms during shift assessment. Patient rates depression 6/10. Goal: Ability to disclose and discuss suicidal ideas will improve  Description: Ability to disclose and discuss suicidal ideas will improve  Outcome: Ongoing  Note: Patient denies suicidal ideations during shift assessment. Goal: Absence of self-harm  Description: Absence of self-harm  Outcome: Ongoing  Note: Patient remians free from self-harming behavior at this time during shift. Goal: Patient specific goal  Description: Patient specific goal  Outcome: Ongoing  Note: Daily goal is to go home. Goal: Participates in care planning  Description: Participates in care planning  Outcome: Ongoing  Note: Patient participates in care planning with staff during shift. Problem: Discharge Planning:  Goal: Discharged to appropriate level of care  Description: Discharged to appropriate level of care  Outcome: Ongoing  Note: No discharge plans noted at this time during shift. Problem: KNOWLEDGE DEFICIT,EDUCATION,DISCHARGE PLAN  Goal: Knowledge - personal safety  Outcome: Ongoing  Note: Patient did not complete safety plan at this time during shift.      Problem: Coping:  Goal: Ability to identify problematic behaviors that deter socialization will improve  Description: Ability to identify problematic behaviors that deter socialization will improve  Outcome: Ongoing  Note: Patient has good interaction with staff and peers when out on unit. Care plan reviewed with patient.   Patient does verbalize understanding of the plan of care and does contribute to goal setting

## 2020-07-14 VITALS
HEART RATE: 65 BPM | TEMPERATURE: 95.7 F | DIASTOLIC BLOOD PRESSURE: 61 MMHG | SYSTOLIC BLOOD PRESSURE: 131 MMHG | WEIGHT: 155 LBS | OXYGEN SATURATION: 98 % | HEIGHT: 70 IN | BODY MASS INDEX: 22.19 KG/M2 | RESPIRATION RATE: 18 BRPM

## 2020-07-14 LAB — VALPROIC ACID LEVEL: 59.6 UG/ML (ref 50–100)

## 2020-07-14 PROCEDURE — 5130000000 HC BRIDGE APPOINTMENT

## 2020-07-14 PROCEDURE — 80164 ASSAY DIPROPYLACETIC ACD TOT: CPT

## 2020-07-14 PROCEDURE — 6370000000 HC RX 637 (ALT 250 FOR IP): Performed by: PSYCHIATRY & NEUROLOGY

## 2020-07-14 PROCEDURE — 36415 COLL VENOUS BLD VENIPUNCTURE: CPT

## 2020-07-14 RX ORDER — DIVALPROEX SODIUM 500 MG/1
500 TABLET, EXTENDED RELEASE ORAL 2 TIMES DAILY
Qty: 60 TABLET | Refills: 0 | Status: SHIPPED | OUTPATIENT
Start: 2020-07-14

## 2020-07-14 RX ORDER — TRAZODONE HYDROCHLORIDE 100 MG/1
TABLET ORAL
Qty: 45 TABLET | Refills: 0 | Status: SHIPPED | OUTPATIENT
Start: 2020-07-14

## 2020-07-14 RX ORDER — CITALOPRAM 20 MG/1
20 TABLET ORAL DAILY
Qty: 30 TABLET | Refills: 0 | Status: SHIPPED | OUTPATIENT
Start: 2020-07-15

## 2020-07-14 RX ORDER — HYDROXYZINE 50 MG/1
50 TABLET, FILM COATED ORAL 3 TIMES DAILY PRN
Qty: 90 TABLET | Refills: 0 | Status: SHIPPED | OUTPATIENT
Start: 2020-07-14 | End: 2020-08-13

## 2020-07-14 RX ADMIN — DIVALPROEX SODIUM 500 MG: 500 TABLET, EXTENDED RELEASE ORAL at 08:10

## 2020-07-14 RX ADMIN — CITALOPRAM 20 MG: 20 TABLET, FILM COATED ORAL at 08:10

## 2020-07-14 ASSESSMENT — PAIN SCALES - GENERAL: PAINLEVEL_OUTOF10: 0

## 2020-07-14 NOTE — PROGRESS NOTES
Behavioral Health   Discharge Note    Pt discharged with followings belongings:   Dentures: None  Vision - Corrective Lenses: None  Hearing Aid: None  Jewelry: None  Body Piercings Removed: N/A  Clothing: Footwear, Pants, Shirt  Were All Patient Medications Collected?: Not Applicable  Other Valuables: Cell phone, Money (Comment), Wallet, Other (Comment)($ 7.00)   Valuables sent home with patient. Valuables retrieved from safe, Security envelope number:  [id number] and returned to patient. Patient left department with staff via ambulatory. Discharged to private residence. \"An Important Message from [employer] About Your Rights\" form photocopy original from admission and provided to pt at discharge N/A. Patient education on aftercare instructions: YES  Bridge Appointment completed: Reviewed Discharge Instructions with patient. Patient verbalizes understanding and agreement with the discharge plan using the teachback method. Patient verbalize understanding of AVS:  YES.     Status EXAM upon discharge:  Status and Exam  Normal: No  Facial Expression: Flat  Affect: Blunt  Level of Consciousness: Alert  Mood:Normal: No  Mood: Sad  Motor Activity:Normal: Yes  Motor Activity: Decreased  Interview Behavior: Cooperative  Preception: [place] to Person, Terrye Christians to Time, [place] to Place, [place] to Situation  Attention:Normal: Yes  Attention: (appears to be intact)  Thought Processes: Other(See comment)(logical)  Thought Content:Normal: Yes  Thought Content: (appears to be intact)  Hallucinations: None  Delusions: No  Memory:Normal: Yes  Memory: (appears to be intact)  Insight and Judgment: No  Insight and Judgment: Poor Insight  Present Suicidal Ideation: No  Present Homicidal Ideation: No    Yosef Argueta RN
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
## 2020-07-14 NOTE — PLAN OF CARE
Discharge planning is in progress. 7/13/2020 1312 by Irma Rahman LPN  Outcome: Ongoing  Note: No discharge plans noted at this time during shift. Problem: Coping:  Goal: Ability to identify problematic behaviors that deter socialization will improve  Description: Ability to identify problematic behaviors that deter socialization will improve  7/14/2020 0028 by Moris Juarez, RN  Outcome: Ongoing  7/13/2020 1312 by Irma Rahman LPN  Outcome: Ongoing  Note: Patient has good interaction with staff and peers when out on unit. Care plan reviewed with patient.   Patient does verbalize understanding of the plan of care and does contribute to goal setting

## 2020-07-14 NOTE — PROGRESS NOTES
Daily Progress Note  Nixon Washington MD  7/14/2020    Reviewed patient's current plan of care and vital signs with nursing staff. Sleep:  5 hours last night broken  Attending groups: Yes  No reported Suicidal thought or anger outbursts; good interaction with peers & staff; Mood 8 on a scale of 1 to 10 with 10 is feeling normal    SUBJECTIVE:    Patient is feeling better. SUICIDAL IDEATION denies suicidal ideation. Patient does not have medication side effects. ROS: Patient has new complaints:  No  Sleeping adequately: Yes  Visitors: No    Mental Status Examination: Patient is cooperative. Speech normal pitch and normal volume. No abnormal movements, tics or mannerisms. Mood dysthymic, affect normal affect. Suicidal ideation Absent. Homicidal ideations Absent. Hallucinations Absent. Delusions Absent. Thought process Goal oriented. Alert and oriented X 3. Attention and concentration fair. MEMORY intact. Insight and Judgement normal insight and judgment. Data   height is 5' 10\" (1.778 m) and weight is 155 lb (70.3 kg). His temperature is 95.7 °F (35.4 °C). His blood pressure is 131/61 and his pulse is 65. His respiration is 18 and oxygen saturation is 98%. Medications  Current Facility-Administered Medications: traZODone (DESYREL) tablet 100 mg, 100 mg, Oral, Nightly  divalproex (DEPAKOTE ER) extended release tablet 500 mg, 500 mg, Oral, BID  hydrOXYzine (ATARAX) tablet 50 mg, 50 mg, Oral, TID PRN  citalopram (CELEXA) tablet 20 mg, 20 mg, Oral, Daily  acetaminophen (TYLENOL) tablet 650 mg, 650 mg, Oral, Q4H PRN  ibuprofen (ADVIL;MOTRIN) tablet 400 mg, 400 mg, Oral, Q6H PRN  polyethylene glycol (GLYCOLAX) packet 17 g, 17 g, Oral, Daily PRN  traZODone (DESYREL) tablet 50 mg, 50 mg, Oral, Nightly PRN    ASSESSMENT  MDD (major depressive disorder), recurrent severe, without psychosis ([facility]     PLAN  Patient s symptoms  are improving  Continue with current medications.     Attempt to develop insight  Psycho-education conducted. Supportive Therapy conducted.   Probable discharge is today  Follow-up with Dr. Ed Wilcox and therapist.

## 2020-07-15 ENCOUNTER — TELEPHONE (OUTPATIENT)
Dept: PSYCHIATRY | Age: 20
End: 2020-07-15

## 2020-07-27 ENCOUNTER — TELEPHONE (OUTPATIENT)
Dept: PSYCHIATRY | Age: 20
End: 2020-07-27

## 2020-07-27 NOTE — TELEPHONE ENCOUNTER
Called patient regarding our second missed counseling appointment today at 1pm. 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  Reminded patient that whether or not he participates in counseling is up to him, he requested to keep on the schedule for next Monday at 1pm.

## 2023-02-07 ENCOUNTER — APPOINTMENT (OUTPATIENT)
Dept: GENERAL RADIOLOGY | Age: 23
End: 2023-02-07
Payer: COMMERCIAL

## 2023-02-07 ENCOUNTER — HOSPITAL ENCOUNTER (EMERGENCY)
Age: 23
Discharge: HOME | End: 2023-02-07
Payer: COMMERCIAL

## 2023-02-07 VITALS
SYSTOLIC BLOOD PRESSURE: 152 MMHG | DIASTOLIC BLOOD PRESSURE: 95 MMHG | TEMPERATURE: 98.5 F | RESPIRATION RATE: 18 BRPM | HEART RATE: 90 BPM | OXYGEN SATURATION: 99 %

## 2023-02-07 DIAGNOSIS — S41.152A DOG BITE OF ARM, LEFT, INITIAL ENCOUNTER: Primary | ICD-10-CM
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
## 2023-02-07 PROCEDURE — 12001 RPR S/N/AX/GEN/TRNK 2.5CM/<: CPT

## 2023-02-07 PROCEDURE — 90715 TDAP VACCINE 7 YRS/> IM: CPT | Performed by: NURSE PRACTITIONER

## 2023-02-07 PROCEDURE — 6360000002 HC RX W HCPCS: Performed by: NURSE PRACTITIONER

## 2023-02-07 PROCEDURE — 90471 IMMUNIZATION ADMIN: CPT | Performed by: NURSE PRACTITIONER

## 2023-02-07 PROCEDURE — 73090 X-RAY EXAM OF FOREARM: CPT

## 2023-02-07 PROCEDURE — 99284 EMERGENCY DEPT VISIT MOD MDM: CPT

## 2023-02-07 RX ORDER — LIDOCAINE HYDROCHLORIDE 10 MG/ML
5 INJECTION, SOLUTION EPIDURAL; INFILTRATION; INTRACAUDAL; PERINEURAL ONCE
Status: DISCONTINUED | OUTPATIENT
Start: 2023-02-07 | End: 2023-02-07 | Stop reason: HOSPADM

## 2023-02-07 RX ORDER — AMOXICILLIN AND CLAVULANATE POTASSIUM 875; 125 MG/1; MG/1
1 TABLET, FILM COATED ORAL 2 TIMES DAILY
Qty: 20 TABLET | Refills: 0 | Status: CANCELLED | OUTPATIENT
Start: 2023-02-07 | End: 2023-02-17

## 2023-02-07 RX ORDER — AMOXICILLIN AND CLAVULANATE POTASSIUM 875; 125 MG/1; MG/1
1 TABLET, FILM COATED ORAL 2 TIMES DAILY
Qty: 20 TABLET | Refills: 0 | Status: SHIPPED | OUTPATIENT
Start: 2023-02-07 | End: 2023-02-17

## 2023-02-07 RX ADMIN — TETANUS TOXOID, REDUCED DIPHTHERIA TOXOID AND ACELLULAR PERTUSSIS VACCINE, ADSORBED 0.5 ML: 5; 2.5; 8; 8; 2.5 SUSPENSION INTRAMUSCULAR at 15:21

## 2023-02-07 ASSESSMENT — PAIN SCALES - GENERAL: PAINLEVEL_OUTOF10: 7

## 2023-02-07 ASSESSMENT — PAIN - FUNCTIONAL ASSESSMENT: PAIN_FUNCTIONAL_ASSESSMENT: 0-10

## 2023-02-07 ASSESSMENT — PAIN DESCRIPTION - LOCATION: LOCATION: ARM

## 2023-02-07 NOTE — ED PROVIDER NOTES
[facility] [address] [address] EMERGENCY DEPT      EMERGENCY MEDICINE     Pt Name: Risa Manuel  MRN: [id number]  [place]astacio 2000  Date of evaluation: 2/7/2023  Provider: ALYSSA Domínguez CNP    CHIEF COMPLAINT       Chief Complaint   Patient presents with    Animal Bite     HISTORY OF PRESENT ILLNESS   Risa Manuel is a pleasant 23 y.o. male who presents to the emergency department from from home, by private vehicle for evaluation of a dog bite. Patient states he was feeding his [employer] bull terriers and trying to separate them when he was bit to the left forearm, leaving two puncture wounds. Patient rates his pain at a 7/10. He is unsure when his last tetanus shot was. PASTMEDICAL HISTORY   No past medical history on file. Patient Active Problem List   Diagnosis Code    MDD (major depressive disorder), recurrent severe, without psychosis ([facility] F33.2    Intermittent explosive disorder F63.81    Cannabis use disorder, moderate, dependence ([facility] F12.20     SURGICAL HISTORY     No past surgical history on file. CURRENT MEDICATIONS       Discharge Medication List as of 2/7/2023  5:06 PM        CONTINUE these medications which have NOT CHANGED    Details   divalproex (DEPAKOTE ER) 500 MG extended release tablet Take 1 tablet by mouth 2 times daily, Disp-60 tablet,R-0Normal      citalopram (CELEXA) 20 MG tablet Take 1 tablet by mouth daily, Disp-30 tablet,R-0Normal      traZODone (DESYREL) 100 MG tablet Take 1 to 1 1/2 tablets at bedtime as needed insomnia, Disp-45 tablet,R-0Normal             ALLERGIES     has No Known Allergies. FAMILY HISTORY     has no family status information on file.         SOCIAL HISTORY       Social History     Tobacco Use    Smoking status: Never    Smokeless tobacco: Never   Vaping Use    Vaping Use: Unknown   Substance Use Topics    Alcohol use: Not Currently    Drug use: Yes     Frequency: 7.0 times per week     Types: Marijuana [facility])     Comment: Daily       PHYSICAL EXAM       ED Triage Vitals [02/07/23 1514]   BP Temp Temp Source Heart Rate Resp SpO2 Height Weight   (!) 152/95 98.5 °F (36.9 °C) Oral 90 18 99 % -- --       Additional Vital Signs:  Vitals:    02/07/23 1514   BP: (!) 152/95   Pulse: 90   Resp: 18   Temp: 98.5 °F (36.9 °C)   SpO2: 99%     Physical Exam  Vitals and nursing note reviewed. Constitutional:       General: He is not in acute distress. Appearance: Normal appearance. HENT:      Head: Normocephalic. Right Ear: External ear normal.      Left Ear: External ear normal.   Eyes:      Conjunctiva/sclera: Conjunctivae normal.   Cardiovascular:      Rate and Rhythm: Normal rate. Pulmonary:      Effort: Pulmonary effort is normal.   Abdominal:      General: Abdomen is flat. Musculoskeletal:         General: Normal range of motion. Cervical back: Normal range of motion. Skin:     General: Skin is warm and dry. Findings: Wound (0.5 cm puncture wound to left forearm. 1.5 cm gaping puncture wound to left forearm.) present. Neurological:      General: No focal deficit present. Mental Status: He is alert and oriented to person, place, and time. Psychiatric:         Mood and Affect: Mood normal.         Behavior: Behavior normal.       FORMAL DIAGNOSTIC RESULTS     RADIOLOGY: Interpretation per the Radiologist below, if available at the time of this note (none if blank):    XR RADIUS ULNA LEFT (2 VIEWS)   Final Result   1. No acute bony abnormality   2. Mild soft tissue gas is noted. Clinical correlation for infection is recommended. 3. No radiopaque foreign body is noted. **This report has been created using voice recognition software. It may contain minor errors which are inherent in voice recognition technology. **      Final report electronically signed by Dr Steven Polk on 2/7/2023 3:47 PM          LABS: (none if blank)  Labs Reviewed - No data to display    (Any cultures that may have been sent were not resulted at the time of this patient visit)    [address] / ED COURSE:     1) Number and Complexity of Problems            Problem List This Visit:         Chief Complaint   Patient presents with    Animal Bite            Differential Diagnosis includes (but not limited to):  Dog bite, retained foreign body        Diagnoses Considered but I have low suspicion of:   Fractured forearm             Pertinent Comorbid Conditions:    N/A    2)  Data Reviewed (none if left blank)          My Independent interpretations:     EKG:      N/A    Imaging:   Left forearm x-ray  no acute bony abnormalities. Mild soft tissue gas is noted. No radiopaque foreign bodies. Labs:      N/A                 See Formal Diagnostic Results above for the lab and radiology tests and orders. 3)  Treatment and Disposition         ED Reassessment:  stable         Case discussed with consulting clinician:           Shared Decision-Making was performed and disposition discussed with the        Patient/Family and questions answered         Summary of Patient Presentation:      MDM  Number of Diagnoses or Management Options  Dog bite of arm, left, initial encounter  Diagnosis management comments: Patient is a 49-year-old male with a history of major depressive disorder, intermittent explosive disorder, cannabis use  who presents the ER today for a dog bite to his left forearm. Patient reports he was feeding his own dogs when one of the dog became aggressive due to the other dog being in the area while eating. Patient reports dog shots are up-to-date. Patient reports he is right-hand dominant. Patient reports unknown last tetanus shot. Patient remains neurovascularly intact. Tdap, left forearm x-ray, lidocaine 1% without epi, suture repair. Left forearm x-ray shows no acute bony abnormalities. Mild soft tissue gas is noted. No radiopaque foreign bodies. Procedure note.   Patient to follow-up in 7 to 10 days for suture removal.  Augmentin twice daily x10 days until complete. Patient given strict return instructions. Amount and/or Complexity of Data Reviewed  Tests in the radiology section of CPT®: ordered and reviewed  Discussion of test results with the performing providers: no  Obtain history from someone other than the patient: no  Discuss the patient with other providers: no  Independent visualization of images, tracings, or specimens: yes    Risk of Complications, Morbidity, and/or Mortality  Presenting problems: low  Diagnostic procedures: low  Management options: low    /   Vitals Reviewed:    Vitals:    02/07/23 1514   BP: (!) 152/95   Pulse: 90   Resp: 18   Temp: 98.5 °F (36.9 °C)   TempSrc: Oral   SpO2: 99%       The patient was seen and examined. Appropriate diagnostic testing was performed and results reviewed with the patient. The results of pertinent diagnostic studies and exam findings were discussed. The patients provisional diagnosis and plan of care were discussed with the patient and present family who expressed understanding. Any medications were reviewed and indications and risks of medications were discussed with the patient /family present. Strict verbal and written return precautions, instructions and appropriate follow-up provided to  the patient.      ED Medications administered this visit:  (None if blank)  Medications   lidocaine PF 1 % injection 5 mL (has no administration in time range)   Tetanus-Diphth-Acell Pertussis (BOOSTRIX) injection 0.5 mL (0.5 mLs IntraMUSCular Given 2/7/23 1521)         PROCEDURES: (None if blank)  Lac Repair    Date/Time: 2/7/2023 5:00 PM  Performed by: ALYSSA Jason CNP  Authorized by: ALYSSA Jason CNP     Consent:     Consent obtained:  Verbal    Consent given by:  Patient    Risks, benefits, and alternatives were discussed: yes      Risks discussed:  Infection and pain    Alternatives discussed:  Delayed treatment  Universal protocol:     Procedure explained and questions answered to patient or proxy's satisfaction: yes      Relevant documents present and verified: yes      Test results available: yes      Imaging studies available: yes      Required blood products, implants, devices, and special equipment available: no      Site/side marked: no      Immediately prior to procedure, a time out was called: no      Patient identity confirmed:  Arm band  Anesthesia:     Anesthesia method:  Local infiltration    Local anesthetic:  Lidocaine 1% w/o epi  Laceration details:     Location:  Shoulder/arm    Shoulder/arm location:  L lower arm    Length (cm):  1.5    Depth (mm):  5  Pre-procedure details:     Preparation:  Patient was prepped and draped in usual sterile fashion and imaging obtained to evaluate for foreign bodies  Exploration:     Limited defect created (wound extended): no      Hemostasis achieved with:  Direct pressure    Imaging obtained: x-ray      Imaging outcome: foreign body not noted      Wound extent: no foreign bodies/material noted, no muscle damage noted, no nerve damage noted, no tendon damage noted, no underlying fracture noted and no vascular damage noted      Contaminated: no    Treatment:     Area cleansed with:  Povidone-iodine    Amount of cleaning:  Standard    Irrigation solution:  Sterile water    Irrigation volume:  20 ml    Irrigation method:  Syringe    Visualized foreign bodies/material removed: no      Debridement:  None  Skin repair:     Repair method:  Sutures    Suture size:  4-0    Suture material:  Nylon    Suture technique:  Simple interrupted    Number of sutures:  1  Approximation:     Approximation:  Close  Repair type:     Repair type:  Simple  Post-procedure details:     Dressing:  Non-adherent dressing    Procedure completion:  Tolerated well, no immediate complications:     CRITICAL CARE: (None if blank)      DISCHARGE PRESCRIPTIONS: (None if blank)  Discharge Medication List as of 2/7/2023  5:06 PM          FINAL IMPRESSION 1. Dog bite of arm, left, initial encounter          DISPOSITION/PLAN   DISPOSITION Decision To Discharge 02/07/2023 05:02:26 PM      OUTPATIENT FOLLOW UP THE PATIENT:  [address] [facility] [address]  [address] [address] [facility] [address]  Schedule an appointment as soon as possible for a visit in 1 week  reevaluation    ALYSSA Guerrero CNP, APRN - CNP  02/07/23 4416

## 2023-02-07 NOTE — DISCHARGE INSTRUCTIONS
Rest, stay well-hydrated. Over-the-counter Tylenol and/or Motrin as needed for pain. Augmentin as prescribed until complete. Wound clean and dry, observe for signs of infection such as redness, drainage of pus, tenderness or fever, if any of these occur seek medical attention promptly. [facility]up with [facility] within the next week for reevaluation. Dressing changes daily, keep wound clean and dry. Stitches out in 7 to 10 days. This can be done by your primary care provider, urgent care or you may return to the ER.

## 2023-02-07 NOTE — ED TRIAGE NOTES
Pt presents to the ED from home with complaints of getting bit by his dog. Pt states he was giving his dog food when the dog latched on to his left arm. Pt rates pain 7/10. Pt has two puncture wounds. No active bleeding.

## 2023-02-15 NOTE — PROGRESS NOTES
Juan Bowens (:  2000) is a 23 y.o. male,Established patient, here for evaluation of the following chief complaint(s):  New Patient (/Stitches removal on arm)         ASSESSMENT/PLAN:  1. Dog bite, subsequent encounter    Dog bite appears to be healing well. Removed stiches and given strict return precautions. Of note, significant social strain, would benefit from having social work help with housing. Return in about 6 weeks (around 3/30/2023). To discuss mood         Subjective   SUBJECTIVE/OBJECTIVE:  HPI    Dog Bite of left forearm  Seen in ER on 23. Bull terrier. Did have wound close with 1 stich. Placed on augmentin for 10 days. Tetanus was given. Dog had his shots. Normal xray. Since then, wound healing well. PMH  MDD  Currently off of celexa, depakote, and trazodone for this. Admitted in  for this. He reports a history of depression since he was 4years old when he was moved to his grandparents due to his parent's history of illicit drugs. His parent  went to care home at that time. He says he was in counseling for a few  months and was on medications for depression and ADHD, but did not take  those medications for long. Since then, improved. Homeless but staying with family and concerned he will be kicked out soon due to dog. Health Maintenenance  Consider HPV vaccine    Review of Systems   Constitutional:  Negative for chills, fatigue and fever. HENT:  Negative for congestion and ear pain. Eyes:  Negative for pain and visual disturbance. Respiratory:  Negative for cough and shortness of breath. Cardiovascular:  Negative for chest pain and leg swelling. Gastrointestinal:  Negative for abdominal pain, constipation and diarrhea. Genitourinary:  Negative for difficulty urinating, dysuria and hematuria. Musculoskeletal:  Negative for arthralgias and myalgias. Allergic/Immunologic: Negative for environmental allergies.    Neurological: Negative for dizziness and headaches. Psychiatric/Behavioral:  Negative for behavioral problems and sleep disturbance. Objective   Physical Exam  Vitals and nursing note reviewed. Constitutional:       Appearance: Normal appearance. HENT:      Head: Normocephalic and atraumatic. Nose: Nose normal.      Mouth/Throat:      Mouth: Mucous membranes are moist.      Pharynx: Oropharynx is clear. Eyes:      Extraocular Movements: Extraocular movements intact. Pupils: Pupils are equal, round, and reactive to light. Cardiovascular:      Rate and Rhythm: Normal rate and regular rhythm. Pulses: Normal pulses. Heart sounds: Normal heart sounds. Pulmonary:      Effort: Pulmonary effort is normal.      Breath sounds: Normal breath sounds. Abdominal:      General: Abdomen is flat. Bowel sounds are normal.   Musculoskeletal:         General: No signs of injury. Normal range of motion. Cervical back: Normal range of motion and neck supple. Skin:     General: Skin is warm and dry. Capillary Refill: Capillary refill takes less than 2 seconds. Neurological:      General: No focal deficit present. Mental Status: He is alert and oriented to person, place, and time. Mental status is at baseline. Psychiatric:         Mood and Affect: Mood normal.         Behavior: Behavior normal.                An electronic signature was used to authenticate this note.     --Magdy Clay MD

## 2023-02-16 ENCOUNTER — OFFICE VISIT (OUTPATIENT)
Dept: FAMILY MEDICINE CLINIC | Age: 23
End: 2023-02-16

## 2023-02-16 VITALS
TEMPERATURE: 98.1 F | HEIGHT: 72 IN | BODY MASS INDEX: 23.98 KG/M2 | HEART RATE: 70 BPM | DIASTOLIC BLOOD PRESSURE: 70 MMHG | WEIGHT: 177 LBS | SYSTOLIC BLOOD PRESSURE: 118 MMHG | OXYGEN SATURATION: 96 %
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
## 2023-02-16 SDOH — ECONOMIC STABILITY: FOOD INSECURITY: WITHIN THE PAST 12 MONTHS, YOU WORRIED THAT YOUR FOOD WOULD RUN OUT BEFORE YOU GOT MONEY TO BUY MORE.: OFTEN TRUE

## 2023-02-16 SDOH — ECONOMIC STABILITY: HOUSING INSECURITY
IN THE LAST 12 MONTHS, WAS THERE A TIME WHEN YOU DID NOT HAVE A STEADY PLACE TO SLEEP OR SLEPT IN A SHELTER (INCLUDING NOW)?: YES

## 2023-02-16 SDOH — ECONOMIC STABILITY: INCOME INSECURITY: HOW HARD IS IT FOR YOU TO PAY FOR THE VERY BASICS LIKE FOOD, HOUSING, MEDICAL CARE, AND HEATING?: NOT VERY HARD

## 2023-02-16 SDOH — ECONOMIC STABILITY: FOOD INSECURITY: WITHIN THE PAST 12 MONTHS, THE FOOD YOU BOUGHT JUST DIDN'T LAST AND YOU DIDN'T HAVE MONEY TO GET MORE.: OFTEN TRUE

## 2023-02-16 ASSESSMENT — PATIENT HEALTH QUESTIONNAIRE - PHQ9
5. POOR APPETITE OR OVEREATING: 1
SUM OF ALL RESPONSES TO PHQ9 QUESTIONS 1 & 2: 1
SUM OF ALL RESPONSES TO PHQ QUESTIONS 1-9: 6
SUM OF ALL RESPONSES TO PHQ QUESTIONS 1-9: 6
1. LITTLE INTEREST OR PLEASURE IN DOING THINGS: 0
10. IF YOU CHECKED OFF ANY PROBLEMS, HOW DIFFICULT HAVE THESE PROBLEMS MADE IT FOR YOU TO DO YOUR WORK, TAKE CARE OF THINGS AT HOME, OR GET ALONG WITH OTHER PEOPLE: 1
7. TROUBLE CONCENTRATING ON THINGS, SUCH AS READING THE NEWSPAPER OR WATCHING TELEVISION: 1
SUM OF ALL RESPONSES TO PHQ QUESTIONS 1-9: 6
9. THOUGHTS THAT YOU WOULD BE BETTER OFF DEAD, OR OF HURTING YOURSELF: 0
8. MOVING OR SPEAKING SO SLOWLY THAT OTHER PEOPLE COULD HAVE NOTICED. OR THE OPPOSITE, BEING SO FIGETY OR RESTLESS THAT YOU HAVE BEEN MOVING AROUND A LOT MORE THAN USUAL: 0
3. TROUBLE FALLING OR STAYING ASLEEP: 1
SUM OF ALL RESPONSES TO PHQ QUESTIONS 1-9: 6
6. FEELING BAD ABOUT YOURSELF - OR THAT YOU ARE A FAILURE OR HAVE LET YOURSELF OR YOUR FAMILY DOWN: 1
2. FEELING DOWN, DEPRESSED OR HOPELESS: 1
4. FEELING TIRED OR HAVING LITTLE ENERGY: 1

## 2023-02-16 ASSESSMENT — ENCOUNTER SYMPTOMS
CONSTIPATION: 0
SHORTNESS OF BREATH: 0
EYE PAIN: 0
COUGH: 0
ABDOMINAL PAIN: 0
DIARRHEA: 0

## 2023-02-16 NOTE — PROGRESS NOTES
S: 23 y.o. male with   Chief Complaint   Patient presents with    New Patient       Stitches removal on arm       HPI: please see resident note for HPI and ROS. 70-year-old male here for suture removal after ER visit for laceration secondary to dog bite. Patient is finishing Augmentin. Wound is healing well without new concerns from patient. Does have a history of depression but declines further treatment at this time. No SI or HI today. BP Readings from Last 3 Encounters:   02/16/23 118/70   02/07/23 (!) 152/95   01/25/20 (!) 142/87     Wt Readings from Last 3 Encounters:   02/16/23 177 lb (80.3 kg)   01/25/20 145 lb (65.8 kg)       O: VS:  height is 6' (1.829 m) and weight is 177 lb (80.3 kg). His temporal temperature is 98.1 °F (36.7 °C). His blood pressure is 118/70 and his pulse is 70. His oxygen saturation is 96%. Diagnosis Orders   1. Dog bite, subsequent encounter            Plan:  Please refer to resident note for full plan. Arm laceration secondary to dog bite: Resolved. Sutures removed today --see resident physician note. Advised to complete course of Augmentin as prescribed. Follow-up with any new concerns. Patient declines further work-up/treatment for history of depression at the this time. No SI or HI today. Plan to monitor closely for any worsening moods or symptoms. Follow-up in 1 month for recheck or sooner if needed. Health Maintenance Due   Topic Date Due    COVID-19 Vaccine (1) Never done    Varicella vaccine (1 of 2 - 2-dose childhood series) Never done    HPV vaccine (1 - Male 2-dose series) Never done    HIV screen  Never done    Hepatitis C screen  Never done    Flu vaccine (1) Never done       Attending Physician Statement  I have discussed the case, including pertinent history and exam findings with the resident. I agree with the documented assessment and plan as documented by the resident.         Tonio Sinclair DO 2/17/2023 1:39 PM

## 2023-02-16 NOTE — PROGRESS NOTES
Health Maintenance Due   Topic Date Due    COVID-19 Vaccine (1) Never done    Varicella vaccine (1 of 2 - 2-dose childhood series) Never done    HPV vaccine (1 - Male 2-dose series) Never done    Depression Monitoring  Never done    HIV screen  Never done    Hepatitis C screen  Never done    Flu vaccine (1) Never done

## 2023-02-16 NOTE — PROGRESS NOTES
[address] [facility] [id number]  Dept: [phone]  Loc: [phone]      Please see Resident note for complete HPI. Patient is here for suture removal after ED visit. He is finishing Augmentin after dog bite. Has history of depression, not taking medication. ROS per Resident    Lab Results   Component Value Date    WBC 7.7 07/11/2020    HGB 16.4 07/11/2020    HCT 47.2 07/11/2020    MCV 91.7 07/11/2020     07/11/2020     Lab Results   Component Value Date     07/10/2020    K 4.7 07/10/2020     07/10/2020    CO2 28 07/10/2020    BUN 14 07/10/2020    CREATININE 0.8 07/10/2020    GLUCOSE 96 07/10/2020    CALCIUM 9.9 07/10/2020    PROT 7.7 07/10/2020    LABALBU 5.1 07/10/2020    BILITOT 0.8 07/10/2020    ALKPHOS 90 07/10/2020    AST 17 07/10/2020    ALT 20 07/10/2020    LABGLOM >90 07/10/2020     No results found for: LABA1C  No results found for: EAG  No results found for: LABMICR, [id number]  Lab Results   Component Value Date    TSH 0.984 07/10/2020    T4FREE 1.39 07/10/2020     No results found for: CHOL  No results found for: TRIG  No results found for: HDL  No results found for: LDLCHOLESTEROL, LDLCALC  No results found for: LABVLDL, VLDL  No results found for: CHOLHDLRATIO  No results found for: PSA, PSADIA    Health Maintenance Due   Topic Date Due    COVID-19 Vaccine (1) Never done    Varicella vaccine (1 of 2 - 2-dose childhood series) Never done    HPV vaccine (1 - Male 2-dose series) Never done    Depression Monitoring  Never done    HIV screen  Never done    Hepatitis C screen  Never done    Flu vaccine (1) Never done         Physical Exam per Resident       ICD-10-CM    1. MDD (major depressive disorder), recurrent severe, without psychosis ([facility] [address]  F33.2       2. Cannabis use disorder, moderate, dependence (HCC)  F12.20       3. Intermittent explosive disorder  F63.81       4. Screening for HIV without presence of risk factors  Z11.4       5. Need for hepatitis C screening test  Z11.59               Plan  I participated in the discussion and care of this patient     Arm laceration secondary to dog bite - Removed sutures today in office. Healing well. Continue Augmentin until finished. MDD - recommend close follow-up and psychiatry referral. Sheela Coma continuing medication. NO SI or HI today.

## 2023-02-21 ENCOUNTER — CARE COORDINATION (OUTPATIENT)
Dept: CARE COORDINATION | Age: 23
End: 2023-02-21

## 2023-02-21 NOTE — CARE COORDINATION
SW called pt after referral from PCP and Rin Mishra. Introduced self and my role. Inquired about housing. Pt stated he is currently living with his uncle and pt is looking to [facility] a home. \" Pt stated he has been pre-approved for a loan just can't find a place. Advised pt to contact realtors and look on [employer]. Pt stated he has been doing that. Discussed getting an apt. But pt stated he has 2 large dogs and would like a yard. Pt is working at [employer] and makes \"a decent living. \" Informed pt that I will mail out resources to him next week from the office and he can contact me any time with questions or concerns. Pt voiced understanding.      Plan of care:  Provide resources

## 2023-02-24 ENCOUNTER — CARE COORDINATION (OUTPATIENT)
Dept: CARE COORDINATION | Age: 23
End: 2023-02-24

## 2023-03-02 ENCOUNTER — CARE COORDINATION (OUTPATIENT)
Dept: CARE COORDINATION | Age: 23
End: 2023-03-02

## 2023-03-14 ENCOUNTER — CARE COORDINATION (OUTPATIENT)
Dept: CARE COORDINATION | Age: 23
End: 2023-03-14

## 2023-03-14 NOTE — CARE COORDINATION
SW follow up with pt he confirmed he received the mailing of resources I sent out. No current needs. Will resolve at this time.

## 2025-05-22 ENCOUNTER — OFFICE VISIT (OUTPATIENT)
Dept: FAMILY MEDICINE CLINIC | Age: 25
End: 2025-05-22
Payer: COMMERCIAL

## 2025-05-22 VITALS
BODY MASS INDEX: 25.71 KG/M2 | OXYGEN SATURATION: 96 % | SYSTOLIC BLOOD PRESSURE: 102 MMHG | WEIGHT: 189.8 LBS | HEART RATE: 76 BPM | TEMPERATURE: 97.3 F | DIASTOLIC BLOOD PRESSURE: 60 MMHG | RESPIRATION RATE: 12 BRPM | HEIGHT: 72 IN

## 2025-05-22 DIAGNOSIS — R09.81 CHRONIC NASAL CONGESTION: ICD-10-CM

## 2025-05-22 DIAGNOSIS — Z76.89 ENCOUNTER TO ESTABLISH CARE: Primary | ICD-10-CM

## 2025-05-22 DIAGNOSIS — F12.20 CANNABIS USE DISORDER, MODERATE, DEPENDENCE (HCC): Chronic | ICD-10-CM

## 2025-05-22 DIAGNOSIS — J34.2 DEVIATED NASAL SEPTUM: ICD-10-CM

## 2025-05-22 DIAGNOSIS — F33.2 MDD (MAJOR DEPRESSIVE DISORDER), RECURRENT SEVERE, WITHOUT PSYCHOSIS (HCC): Chronic | ICD-10-CM

## 2025-05-22 DIAGNOSIS — F63.81 INTERMITTENT EXPLOSIVE DISORDER: Chronic | ICD-10-CM

## 2025-05-22 PROCEDURE — 1036F TOBACCO NON-USER: CPT | Performed by: NURSE PRACTITIONER

## 2025-05-22 PROCEDURE — G8419 CALC BMI OUT NRM PARAM NOF/U: HCPCS | Performed by: NURSE PRACTITIONER

## 2025-05-22 PROCEDURE — 99204 OFFICE O/P NEW MOD 45 MIN: CPT | Performed by: NURSE PRACTITIONER

## 2025-05-22 PROCEDURE — G8427 DOCREV CUR MEDS BY ELIG CLIN: HCPCS | Performed by: NURSE PRACTITIONER

## 2025-05-22 RX ORDER — ESCITALOPRAM OXALATE 10 MG/1
10 TABLET ORAL DAILY
Qty: 30 TABLET | Refills: 1 | Status: SHIPPED | OUTPATIENT
Start: 2025-05-22

## 2025-05-22 SDOH — ECONOMIC STABILITY: FOOD INSECURITY: WITHIN THE PAST 12 MONTHS, YOU WORRIED THAT YOUR FOOD WOULD RUN OUT BEFORE YOU GOT MONEY TO BUY MORE.: NEVER TRUE

## 2025-05-22 SDOH — ECONOMIC STABILITY: FOOD INSECURITY: WITHIN THE PAST 12 MONTHS, THE FOOD YOU BOUGHT JUST DIDN'T LAST AND YOU DIDN'T HAVE MONEY TO GET MORE.: NEVER TRUE

## 2025-05-22 ASSESSMENT — PATIENT HEALTH QUESTIONNAIRE - PHQ9
3. TROUBLE FALLING OR STAYING ASLEEP: NOT AT ALL
9. THOUGHTS THAT YOU WOULD BE BETTER OFF DEAD, OR OF HURTING YOURSELF: SEVERAL DAYS
SUM OF ALL RESPONSES TO PHQ QUESTIONS 1-9: 5
2. FEELING DOWN, DEPRESSED OR HOPELESS: SEVERAL DAYS
SUM OF ALL RESPONSES TO PHQ QUESTIONS 1-9: 5
SUM OF ALL RESPONSES TO PHQ QUESTIONS 1-9: 5
4. FEELING TIRED OR HAVING LITTLE ENERGY: NOT AT ALL
10. IF YOU CHECKED OFF ANY PROBLEMS, HOW DIFFICULT HAVE THESE PROBLEMS MADE IT FOR YOU TO DO YOUR WORK, TAKE CARE OF THINGS AT HOME, OR GET ALONG WITH OTHER PEOPLE: VERY DIFFICULT
8. MOVING OR SPEAKING SO SLOWLY THAT OTHER PEOPLE COULD HAVE NOTICED. OR THE OPPOSITE, BEING SO FIGETY OR RESTLESS THAT YOU HAVE BEEN MOVING AROUND A LOT MORE THAN USUAL: NOT AT ALL
1. LITTLE INTEREST OR PLEASURE IN DOING THINGS: NOT AT ALL
7. TROUBLE CONCENTRATING ON THINGS, SUCH AS READING THE NEWSPAPER OR WATCHING TELEVISION: NOT AT ALL
5. POOR APPETITE OR OVEREATING: NEARLY EVERY DAY
6. FEELING BAD ABOUT YOURSELF - OR THAT YOU ARE A FAILURE OR HAVE LET YOURSELF OR YOUR FAMILY DOWN: NOT AT ALL
SUM OF ALL RESPONSES TO PHQ QUESTIONS 1-9: 4

## 2025-05-22 ASSESSMENT — COLUMBIA-SUICIDE SEVERITY RATING SCALE - C-SSRS
1. WITHIN THE PAST MONTH, HAVE YOU WISHED YOU WERE DEAD OR WISHED YOU COULD GO TO SLEEP AND NOT WAKE UP?: NO
2. HAVE YOU ACTUALLY HAD ANY THOUGHTS OF KILLING YOURSELF?: NO
6. HAVE YOU EVER DONE ANYTHING, STARTED TO DO ANYTHING, OR PREPARED TO DO ANYTHING TO END YOUR LIFE?: NO

## 2025-05-22 NOTE — PROGRESS NOTES
[facility] at [facility]  [address] [place] [address]  Chief Complaint   Patient presents with    New Patient     States thinks has a deviated septum. Wanting to get a referral for this.        History obtained from chart review and the patient.    SUBJECTIVE:  Albino Banks is a 25 y.o. male that presents today for establishing care with new physician, etc. New patient, 1st time visit to [facility] @ [facility].    Deviated Nasal Septum  C/o deviated nasal septum after he was elbowed in a basketball game in [employer]  His nose has never been straight since  He does have difficulty breathing through the left side  Occasionally feels like something moves in his nose and then can't breathe out the opposite side  He does also have chronic rhinorrhea    MDD  Struggling with depression since he was about 12 years old  Parents were both in senior living for drug dealing, he moved up to [place] from home in [place]  Parents ended up getting a divorce, lived with mom  Depressed 2-3 days/week  Has had some suicidal thoughts in the past, but denies any plan or intent  Energy/motivation are fair  Sleep is variable, but works swing shift  Appetite is fair  + irritability, can be very angry quickly over very little things  Social ETOH use, he does smoke marijuana daily  Denies any other substance use    Age/Gender Health Maintenance    Lipid - 40  DM Screen - 40  Colon Cancer Screening - 45  Lung Cancer Screening (Age 50 to 80 with 30 pack year hx, current smoker or quit within past 15 years) - n/a    Tetanus - every 10 years  Influenza Vaccine - yearly  Pneumonia Vaccine - 65  Zostavax - 50   HPV Vaccine - aged out    PSA testing discussion - 55  AAA Screening - 63      Current Outpatient Medications   Medication Sig Dispense Refill    escitalopram (LEXAPRO) 10 MG tablet Take 1 tablet by mouth daily 30 tablet 1     No current facility-administered medications for this visit.     Orders Placed This Encounter   Medications

## 2025-06-24 ENCOUNTER — OFFICE VISIT (OUTPATIENT)
Dept: FAMILY MEDICINE CLINIC | Age: 25
End: 2025-06-24
Payer: COMMERCIAL

## 2025-06-24 VITALS
HEIGHT: 72 IN | TEMPERATURE: 97.5 F | WEIGHT: 183.8 LBS | HEART RATE: 90 BPM | SYSTOLIC BLOOD PRESSURE: 106 MMHG | RESPIRATION RATE: 12 BRPM | BODY MASS INDEX: 24.89 KG/M2 | OXYGEN SATURATION: 97 % | DIASTOLIC BLOOD PRESSURE: 68 MMHG

## 2025-06-24 DIAGNOSIS — F63.81 INTERMITTENT EXPLOSIVE DISORDER: Chronic | ICD-10-CM

## 2025-06-24 DIAGNOSIS — F33.2 MDD (MAJOR DEPRESSIVE DISORDER), RECURRENT SEVERE, WITHOUT PSYCHOSIS (HCC): Chronic | ICD-10-CM

## 2025-06-24 PROCEDURE — 99213 OFFICE O/P EST LOW 20 MIN: CPT | Performed by: NURSE PRACTITIONER

## 2025-06-24 PROCEDURE — 1036F TOBACCO NON-USER: CPT | Performed by: NURSE PRACTITIONER

## 2025-06-24 PROCEDURE — G8427 DOCREV CUR MEDS BY ELIG CLIN: HCPCS | Performed by: NURSE PRACTITIONER

## 2025-06-24 PROCEDURE — G8420 CALC BMI NORM PARAMETERS: HCPCS | Performed by: NURSE PRACTITIONER

## 2025-06-24 RX ORDER — ESCITALOPRAM OXALATE 10 MG/1
10 TABLET ORAL DAILY
Qty: 90 TABLET | Refills: 1 | Status: SHIPPED | OUTPATIENT
Start: 2025-06-24

## 2025-06-24 NOTE — PROGRESS NOTES
[facility] at [facility]  [address] [place] [address]  Chief Complaint   Patient presents with    Depression     States doing okay on Lexapro. States having some side effects with medication.        History obtained from chart review and the patient.    SUBJECTIVE:  Albino Banks is a 25 y.o. male that presents today for follow up MDD      MDD  Moods are doing better  Denies any depression  Denies any SI/HI  Energy/motivation are improved  Sleep is variable, but works swing shift  Appetite is ok  Irritability is improved, able to let things go easier  Social ETOH use, he does smoke marijuana daily  Libido is ok  Notes some delayed ejaculation with Lexapro, happens sometimes-feels like its manageable    Age/Gender Health Maintenance    Lipid - 40  DM Screen - 40  Colon Cancer Screening - 45  Lung Cancer Screening (Age 50 to 80 with 30 pack year hx, current smoker or quit within past 15 years) - n/a    Tetanus - every 10 years  Influenza Vaccine - yearly  Pneumonia Vaccine - 65  Zostavax - 50   HPV Vaccine - aged out    PSA testing discussion - 55  AAA Screening - 65      Current Outpatient Medications   Medication Sig Dispense Refill    escitalopram (LEXAPRO) 10 MG tablet Take 1 tablet by mouth daily 90 tablet 1     No current facility-administered medications for this visit.     Orders Placed This Encounter   Medications    escitalopram (LEXAPRO) 10 MG tablet     Sig: Take 1 tablet by mouth daily     Dispense:  90 tablet     Refill:  1         All medications reviewed and reconciled, including OTC and herbal medications. Updated list given to patient.       Patient Active Problem List   Diagnosis    MDD (major depressive disorder), recurrent severe, without psychosis (HCC)    Intermittent explosive disorder    Cannabis use disorder, moderate, dependence (HCC)       History reviewed. No pertinent past medical history.    History reviewed. No pertinent surgical history.    No Known